# Patient Record
Sex: MALE | Race: WHITE | Employment: OTHER | URBAN - METROPOLITAN AREA
[De-identification: names, ages, dates, MRNs, and addresses within clinical notes are randomized per-mention and may not be internally consistent; named-entity substitution may affect disease eponyms.]

---

## 2018-01-16 PROBLEM — L85.3 DRY SKIN DERMATITIS: Status: ACTIVE | Noted: 2018-01-16

## 2018-07-16 PROBLEM — F32.A DEPRESSION: Status: ACTIVE | Noted: 2018-07-16

## 2019-12-19 ENCOUNTER — APPOINTMENT (OUTPATIENT)
Dept: CT IMAGING | Age: 84
End: 2019-12-19
Attending: EMERGENCY MEDICINE
Payer: MEDICARE

## 2019-12-19 ENCOUNTER — HOSPITAL ENCOUNTER (EMERGENCY)
Age: 84
Discharge: HOME | End: 2019-12-19
Attending: EMERGENCY MEDICINE
Payer: MEDICARE

## 2019-12-19 VITALS
OXYGEN SATURATION: 96 % | HEIGHT: 64 IN | TEMPERATURE: 98.5 F | DIASTOLIC BLOOD PRESSURE: 87 MMHG | WEIGHT: 157 LBS | SYSTOLIC BLOOD PRESSURE: 167 MMHG | BODY MASS INDEX: 26.8 KG/M2 | RESPIRATION RATE: 14 BRPM | HEART RATE: 93 BPM

## 2019-12-19 DIAGNOSIS — R51.9 ACUTE NONINTRACTABLE HEADACHE, UNSPECIFIED HEADACHE TYPE: Primary | ICD-10-CM

## 2019-12-19 LAB
ANION GAP SERPL CALC-SCNC: 7 MMOL/L (ref 7–16)
APTT PPP: 31.4 SEC (ref 24.7–39.8)
BUN SERPL-MCNC: 12 MG/DL (ref 8–23)
CALCIUM SERPL-MCNC: 8.7 MG/DL (ref 8.3–10.4)
CHLORIDE SERPL-SCNC: 102 MMOL/L (ref 98–107)
CO2 SERPL-SCNC: 28 MMOL/L (ref 21–32)
CREAT SERPL-MCNC: 0.88 MG/DL (ref 0.8–1.5)
ERYTHROCYTE [DISTWIDTH] IN BLOOD BY AUTOMATED COUNT: 12.4 % (ref 11.9–14.6)
GLUCOSE SERPL-MCNC: 110 MG/DL (ref 65–100)
HCT VFR BLD AUTO: 46.4 % (ref 41.1–50.3)
HGB BLD-MCNC: 16 G/DL (ref 13.6–17.2)
INR PPP: 1
MCH RBC QN AUTO: 33.1 PG (ref 26.1–32.9)
MCHC RBC AUTO-ENTMCNC: 34.5 G/DL (ref 31.4–35)
MCV RBC AUTO: 96.1 FL (ref 79.6–97.8)
NRBC # BLD: 0 K/UL (ref 0–0.2)
PLATELET # BLD AUTO: 182 K/UL (ref 150–450)
PMV BLD AUTO: 10.2 FL (ref 9.4–12.3)
POTASSIUM SERPL-SCNC: 3.7 MMOL/L (ref 3.5–5.1)
PROTHROMBIN TIME: 13.7 SEC (ref 11.7–14.5)
RBC # BLD AUTO: 4.83 M/UL (ref 4.23–5.6)
SODIUM SERPL-SCNC: 137 MMOL/L (ref 136–145)
WBC # BLD AUTO: 9.2 K/UL (ref 4.3–11.1)

## 2019-12-19 PROCEDURE — 85730 THROMBOPLASTIN TIME PARTIAL: CPT

## 2019-12-19 PROCEDURE — 80048 BASIC METABOLIC PNL TOTAL CA: CPT

## 2019-12-19 PROCEDURE — 96375 TX/PRO/DX INJ NEW DRUG ADDON: CPT | Performed by: EMERGENCY MEDICINE

## 2019-12-19 PROCEDURE — 74011250636 HC RX REV CODE- 250/636: Performed by: EMERGENCY MEDICINE

## 2019-12-19 PROCEDURE — 96374 THER/PROPH/DIAG INJ IV PUSH: CPT | Performed by: EMERGENCY MEDICINE

## 2019-12-19 PROCEDURE — 85610 PROTHROMBIN TIME: CPT

## 2019-12-19 PROCEDURE — 70450 CT HEAD/BRAIN W/O DYE: CPT

## 2019-12-19 PROCEDURE — 85027 COMPLETE CBC AUTOMATED: CPT

## 2019-12-19 PROCEDURE — 99284 EMERGENCY DEPT VISIT MOD MDM: CPT | Performed by: EMERGENCY MEDICINE

## 2019-12-19 RX ORDER — MORPHINE SULFATE 4 MG/ML
2 INJECTION INTRAVENOUS
Status: COMPLETED | OUTPATIENT
Start: 2019-12-19 | End: 2019-12-19

## 2019-12-19 RX ORDER — ONDANSETRON 2 MG/ML
4 INJECTION INTRAMUSCULAR; INTRAVENOUS
Status: COMPLETED | OUTPATIENT
Start: 2019-12-19 | End: 2019-12-19

## 2019-12-19 RX ORDER — SODIUM CHLORIDE 0.9 % (FLUSH) 0.9 %
5-40 SYRINGE (ML) INJECTION AS NEEDED
Status: DISCONTINUED | OUTPATIENT
Start: 2019-12-19 | End: 2019-12-19 | Stop reason: HOSPADM

## 2019-12-19 RX ORDER — SODIUM CHLORIDE 0.9 % (FLUSH) 0.9 %
5-40 SYRINGE (ML) INJECTION EVERY 8 HOURS
Status: DISCONTINUED | OUTPATIENT
Start: 2019-12-19 | End: 2019-12-19 | Stop reason: HOSPADM

## 2019-12-19 RX ORDER — BUTALBITAL, ACETAMINOPHEN AND CAFFEINE 300; 40; 50 MG/1; MG/1; MG/1
1 CAPSULE ORAL 3 TIMES DAILY
Qty: 11 CAP | Refills: 0 | Status: SHIPPED | OUTPATIENT
Start: 2019-12-19 | End: 2020-01-13 | Stop reason: SDUPTHER

## 2019-12-19 RX ADMIN — MORPHINE SULFATE 2 MG: 4 INJECTION INTRAVENOUS at 14:49

## 2019-12-19 RX ADMIN — ONDANSETRON 4 MG: 2 INJECTION INTRAMUSCULAR; INTRAVENOUS at 14:48

## 2019-12-19 NOTE — ED TRIAGE NOTES
Patient arrives via EMS from PCP office. Patient complains of headache since last Friday. Patient had CT ordered by PCP. Stated patient had previous bleed on old CT and bleed looked bigger on new CT scan. No s/sx of stroke. GCS 15. VS for EMS: bp 135/79, paced 70-80.

## 2019-12-19 NOTE — DISCHARGE INSTRUCTIONS
Return with any fevers, vomiting, confusion, worsening symptoms, or additional concerns. Follow-up with your primary care doctor for reevaluation as needed.

## 2019-12-19 NOTE — ED PROVIDER NOTES
26-year-old gentleman presents with concerns about a frontal headache that has been present for about a week and a half. He apparently was seen by his primary care doctor yesterday for this and had a outpatient head CT scan that showed a possible acute on chronic subdural hemorrhage. Patient then followed up with his primary care doctor today because of worsening symptoms and he was sent here for further evaluation. He said that he does not have any recollection of a significant fall or injury within the last several months. He denies any fevers or chills. He says he does not take any blood thinners. Elements of this note were created using speech recognition software. As such, errors of speech recognition may be present. 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    left    Atherosclerosis of native arteries of the extremities with intermittent claudication 7/14/2015    Atrioventricular block, complete ([address] 7/14/2015    S/p pacer    Backache, unspecified 7/14/2015    Bladder neck obstruction 7/14/2015    Bronchiectasis with acute exacerbation ([address] 7/14/2015    Cancer (HCC)     prostate    Cardiac pacemaker 3/23/2016    Carpal tunnel syndrome 7/14/2015    cervical radiculopathy 7/14/2015    Chest pain, unspecified 7/14/2015    Chronic lung disease     Chronic otitis externa 8/4/2016    COPD (chronic obstructive pulmonary disease) ([address] 3/23/2016    Coronary artery disease involving native coronary artery of native heart without angina pectoris 08/04/2011    LHC:mild to moderate CAD with normal LV EF.     Dyspepsia and other specified disorders of function of stomach 7/14/2015    Emphysema of lung ([facility] 8/4/2016    Encounter for long-term (current) use of other medications 7/14/2015    Erectile dysfunction 3/23/2016    Generalized anxiety disorder 7/14/2015    hyperlipidemia 7/14/2015    Hypertrophy of breast 7/14/2015    Insomnia, unspecified 7/14/2015    Malignant neoplasm of prostate ([facility] 7/14/2015    Followed by dr Kelly Judge    Migraine, unspecified, without mention of intractable migraine without mention of status migrainosus 7/14/2015    Other constipation 7/14/2015    Other dyspnea and respiratory abnormality 7/14/2015    Other malaise and fatigue 7/14/2015    Pacemaker     Panic attacks 8/4/2016    Paroxysmal atrial fibrillation ([facility] 3/23/2016    Peyronie's disease 7/14/2015    Pneumonia, organism unspecified(486) 7/14/2015    Psychiatric disorder     anxiety attacks    PUD (peptic ulcer disease)     pulmonary fibrosis 7/14/2015    RBBB (right bundle branch block)     SNHL (sensorineural hearing loss) 8/4/2016    ulnar palsy 7/14/2015    Unspecified asthma(493.90) 7/14/2015       Past Surgical History:   Procedure Laterality Date    HX CHOLECYSTECTOMY  5/2/2003    HX OTHER SURGICAL      Prostate Cancer    HX PACEMAKER      HX TURP      HX VASECTOMY           Family History:   Problem Relation Age of Onset    Heart Attack Mother     Kidney Disease Father     Diabetes Sister        Social History     Socioeconomic History    Marital status:      Spouse name: Not on file    Number of children: Not on file    Years of education: Not on file    Highest education level: Not on file   Occupational History    Not on file   Social Needs    Financial resource strain: Not on file    Food insecurity:     Worry: Not on file     Inability: Not on file    Transportation needs:     Medical: Not on file     Non-medical: Not on file   Tobacco Use    Smoking status: Former Smoker     Packs/day: 5.00     Years: 50.00     Pack years: 250.00     Types: Cigarettes Last attempt to quit: 8/15/1991     Years since quittin.3    Smokeless tobacco: Current User    Tobacco comment: quit in ; cans of smokeless tobacco per week   Substance and Sexual Activity    Alcohol use: No     Alcohol/week: 0.0 standard drinks    Drug use: Yes     Types: Prescription    Sexual activity: Not on file   Lifestyle    Physical activity:     Days per week: Not on file     Minutes per session: Not on file    Stress: Not on file   Relationships    Social connections:     Talks on phone: Not on file     Gets together: Not on file     Attends [employer] service: Not on file     Active member of club or organization: Not on file     Attends meetings of clubs or organizations: Not on file     Relationship status: Not on file    Intimate partner violence:     Fear of current or ex partner: Not on file     Emotionally abused: Not on file     Physically abused: Not on file     Forced sexual activity: Not on file   Other Topics Concern     Service Not Asked    Blood Transfusions Not Asked    Caffeine Concern Not Asked    Occupational Exposure Not Asked   [employer] Hazards Not Asked    Sleep Concern Not Asked    Stress Concern Not Asked    Weight Concern Not Asked    Special Diet Not Asked    Back Care Not Asked    Exercise Yes    Bike Helmet Not Asked    Seat Belt Not Asked    Self-Exams Not Asked   Social History Narrative    Not on file         ALLERGIES: Patient has no known allergies. Review of Systems   Constitutional: Negative for chills, diaphoresis and fever. HENT: Negative for congestion, rhinorrhea and sore throat. Eyes: Negative for redness and visual disturbance. Respiratory: Negative for cough, chest tightness, shortness of breath and wheezing. Cardiovascular: Negative for chest pain and palpitations. Gastrointestinal: Positive for nausea. Negative for abdominal pain, blood in stool, diarrhea and vomiting.    Endocrine: Negative for polydipsia and polyuria. Genitourinary: Negative for dysuria and hematuria. Musculoskeletal: Negative for arthralgias, myalgias and neck stiffness. Skin: Negative for rash. Allergic/Immunologic: Negative for environmental allergies and food allergies. Neurological: Positive for headaches. Negative for dizziness and weakness. Hematological: Negative for adenopathy. Does not bruise/bleed easily. Psychiatric/Behavioral: Negative for confusion and sleep disturbance. The patient is not nervous/anxious. Vitals:    12/19/19 1310 12/19/19 1311 12/19/19 1332 12/19/19 1334   BP: 156/81 156/81     Pulse: 100 (!) 103 99 97   Resp: 16  20 24   Temp: 98.5 °F (36.9 °C)      SpO2: 95% 94% 97% 97%   Weight: 71.2 kg (157 lb)      Height: 5' 4\" (1.626 m)               Physical Exam  Vitals signs and nursing note reviewed. Constitutional:       General: He is not in acute distress. Appearance: He is well-developed. He is not toxic-appearing. HENT:      Head: Normocephalic and atraumatic. Eyes:      General: No scleral icterus. Right eye: No discharge. Left eye: No discharge. Conjunctiva/sclera: Conjunctivae normal.      Pupils: Pupils are equal, round, and reactive to light. Neck:      Musculoskeletal: Normal range of motion. No neck rigidity. Cardiovascular:      Rate and Rhythm: Normal rate and regular rhythm. Heart sounds: Normal heart sounds. Pulmonary:      Effort: Pulmonary effort is normal. No respiratory distress. Breath sounds: Normal breath sounds. No wheezing or rales. Chest:      Chest wall: No tenderness. Abdominal:      General: Bowel sounds are normal. There is no distension. Palpations: Abdomen is soft. Tenderness: There is no guarding or rebound. Musculoskeletal: Normal range of motion. General: No tenderness. Lymphadenopathy:      Cervical: No cervical adenopathy. Skin:     General: Skin is warm and dry.    Neurological:      General: No focal deficit present. Mental Status: He is alert and oriented to person, place, and time. Psychiatric:         Mood and Affect: Mood normal.         Behavior: Behavior normal.          MDM  Number of Diagnoses or Management Options  Diagnosis management comments: I will repeat the head CT scan to see if there is been any expansion from yesterday's. I will check his basic blood work and try to treat his nausea. CT head findings are fairly minimal.  I do not think they are significant source of symptoms for the patient. He may just have a benign headache. I will discharge him home with some headache medicine and encouraged him to get a repeat CT scan in 72 hours.          Procedures

## 2019-12-19 NOTE — ED NOTES
I have reviewed discharge instructions with the patient. The patient verbalized understanding. Patient left ED via Discharge Method: wheelchair to Home with family. Opportunity for questions and clarification provided. Patient given 1 scripts. To continue your aftercare when you leave the hospital, you may receive an automated call from our care team to check in on how you are doing. This is a free service and part of our promise to provide the best care and service to meet your aftercare needs.  If you have questions, or wish to unsubscribe from this service please call [phone]. Thank you for Choosing our [facility].

## 2019-12-30 PROBLEM — G96.08 SUBDURAL HYGROMA: Status: ACTIVE | Noted: 2019-12-30

## 2019-12-30 PROBLEM — I65.09 VERTEBRAL ARTERY STENOSIS: Status: ACTIVE | Noted: 2019-12-30

## 2021-05-05 ENCOUNTER — HOSPITAL ENCOUNTER (OUTPATIENT)
Dept: ULTRASOUND IMAGING | Age: 86
Discharge: HOME | End: 2021-05-05
Attending: INTERNAL MEDICINE
Payer: MEDICARE

## 2021-05-05 DIAGNOSIS — I70.213 ATHEROSCLEROSIS OF NATIVE ARTERY OF BOTH LOWER EXTREMITIES WITH INTERMITTENT CLAUDICATION (HCC): ICD-10-CM

## 2021-05-05 PROCEDURE — 93922 UPR/L XTREMITY ART 2 LEVELS: CPT

## 2022-03-19 PROBLEM — F32.A DEPRESSION: Status: ACTIVE | Noted: 2018-07-16

## 2022-03-19 PROBLEM — I65.09 VERTEBRAL ARTERY STENOSIS: Status: ACTIVE | Noted: 2019-12-30

## 2022-03-20 PROBLEM — G96.08 SUBDURAL HYGROMA: Status: ACTIVE | Noted: 2019-12-30

## 2022-03-20 PROBLEM — L85.3 DRY SKIN DERMATITIS: Status: ACTIVE | Noted: 2018-01-16

## 2022-06-03 ENCOUNTER — OFFICE VISIT (OUTPATIENT)
Dept: CARDIOLOGY CLINIC | Age: 87
End: 2022-06-03
Payer: MEDICARE

## 2022-06-03 VITALS
SYSTOLIC BLOOD PRESSURE: 122 MMHG | WEIGHT: 156 LBS | HEIGHT: 64 IN | BODY MASS INDEX: 26.63 KG/M2 | DIASTOLIC BLOOD PRESSURE: 62 MMHG | HEART RATE: 69 BPM

## 2022-06-03 DIAGNOSIS — I48.0 PAROXYSMAL ATRIAL FIBRILLATION (HCC): Primary | ICD-10-CM

## 2022-06-03 DIAGNOSIS — I25.10 CORONARY ARTERY DISEASE INVOLVING NATIVE CORONARY ARTERY OF NATIVE HEART WITHOUT ANGINA PECTORIS: ICD-10-CM

## 2022-06-03 DIAGNOSIS — Z95.0 PACEMAKER: ICD-10-CM

## 2022-06-03 PROCEDURE — G8417 CALC BMI ABV UP PARAM F/U: HCPCS | Performed by: INTERNAL MEDICINE

## 2022-06-03 PROCEDURE — 4004F PT TOBACCO SCREEN RCVD TLK: CPT | Performed by: INTERNAL MEDICINE

## 2022-06-03 PROCEDURE — 1123F ACP DISCUSS/DSCN MKR DOCD: CPT | Performed by: INTERNAL MEDICINE

## 2022-06-03 PROCEDURE — 99214 OFFICE O/P EST MOD 30 MIN: CPT | Performed by: INTERNAL MEDICINE

## 2022-06-03 PROCEDURE — 93000 ELECTROCARDIOGRAM COMPLETE: CPT | Performed by: INTERNAL MEDICINE

## 2022-06-03 PROCEDURE — G8427 DOCREV CUR MEDS BY ELIG CLIN: HCPCS | Performed by: INTERNAL MEDICINE

## 2022-06-03 RX ORDER — DOCUSATE SODIUM 250 MG
250 CAPSULE ORAL DAILY
COMMUNITY

## 2022-06-03 RX ORDER — FOLIC ACID 0.8 MG
TABLET ORAL DAILY
COMMUNITY

## 2022-06-03 ASSESSMENT — ENCOUNTER SYMPTOMS
BOWEL INCONTINENCE: 0
CHEST TIGHTNESS: 0
ABDOMINAL PAIN: 0
SHORTNESS OF BREATH: 0
BLURRED VISION: 0
SPUTUM PRODUCTION: 0
HEMATEMESIS: 0
WHEEZING: 0
ORTHOPNEA: 0
COLOR CHANGE: 0
HOARSE VOICE: 0
HEMATOCHEZIA: 0
DIARRHEA: 0

## 2022-06-03 NOTE — PROGRESS NOTES
[facility] CARDIOLOGY  [address], [address] [facility], [address] [place] [address]  PHONE: [phone]        22        NAME:  Abel Cristina  :   MRN: [id number]       SUBJECTIVE:   Abel Cristina is a 80 y.o. male seen for a follow up visit regarding the following: The patient has a hx of mild CAD,SDH,SSS with pacemaker,and chronic chest pain. He returns for follow up. Echo in 2022 reported low normal LV EF(50-55%),aortic valve sclerosis,mild MR,and mild to moderate TR. I discussed echo results with the patient. Bentley Guevara reports having issues with balance,walking,and headaches. Chief Complaint   Patient presents with    Coronary Artery Disease     late yearly    Atrial Fibrillation       HPI:    Coronary Artery Disease  Presents for follow-up visit. Symptoms include dizziness and muscle weakness. Pertinent negatives include no chest pain, chest pressure, chest tightness, leg swelling, palpitations, shortness of breath or weight gain. Past Medical History, Past Surgical History, Family history, Social History, and Medications were all reviewed with the patient today and updated as necessary.          Current Outpatient Medications:     Garlic 0141 MG CAPS, Take by mouth daily, Disp: , Rfl:     Multiple Vitamins-Minerals (CENTRUM SILVER 50+MEN PO), Take by mouth daily, Disp: , Rfl:     vitamin D (CHOLECALCIFEROL) 25 MCG (1000 UT) TABS tablet, Take 1,000 Units by mouth daily, Disp: , Rfl:     docusate sodium (STOOL SOFTENER) 250 MG capsule, Take 250 mg by mouth daily, Disp: , Rfl:     Magnesium 500 MG CAPS, Take by mouth daily, Disp: , Rfl:     AZO CRANBERRY GUMMIES PO, Take by mouth daily, Disp: , Rfl:     Glucos-Chond-Hyal Ac-Ca Fructo (MOVE FREE JOINT HEALTH ADVANCE PO), Take by mouth daily, Disp: , Rfl:     acetaminophen (TYLENOL) 500 MG tablet, Take 1,000 mg by mouth every 6 hours as needed, Disp: , Rfl:     albuterol sulfate  (90 Base) MCG/ACT inhaler, Inhale 2 puffs into the lungs every 4 hours as needed, Disp: , Rfl:     aspirin 81 MG EC tablet, Take 81 mg by mouth daily, Disp: , Rfl:     fluorouracil (EFUDEX) 5 % cream, Apply topically daily, Disp: , Rfl:     fluticasone (FLONASE) 50 MCG/ACT nasal spray, 2 sprays by Nasal route daily as needed, Disp: , Rfl:     fluticasone (FLOVENT HFA) 44 MCG/ACT inhaler, Inhale 2 puffs into the lungs 2 times daily, Disp: , Rfl:     ibuprofen (ADVIL;MOTRIN) 200 MG tablet, Take 400 mg by mouth every 8 hours as needed, Disp: , Rfl:     levothyroxine (SYNTHROID) 50 MCG tablet, TAKE 1 TABLET BY MOUTH EVERY DAY BEFORE BREAKFAST, Disp: , Rfl:     LORazepam (ATIVAN) 1 MG tablet, TAKE 1 TAB BY MOUTH 2 TIMES DAILY AS NEEDED FOR ANXIETY. MAX 2 PER DAY, Disp: , Rfl:     meclizine (ANTIVERT) 25 MG tablet, Take 25 mg by mouth 3 times daily as needed, Disp: , Rfl:     nitroGLYCERIN (NITROSTAT) 0.4 MG SL tablet, Place 0.4 mg under the tongue, Disp: , Rfl:     potassium gluconate 550 mg tablet, Take 99 mg by mouth daily, Disp: , Rfl:     traMADol (ULTRAM) 50 MG tablet, Take 50 mg by mouth every 8 hours as needed. 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      Pulmonary:      Effort: Pulmonary effort is normal.      Breath sounds: Normal breath sounds. Abdominal:      General: Abdomen is flat. Bowel sounds are normal.      Palpations: Abdomen is soft. Musculoskeletal:         General: Normal range of motion. Cervical back: Normal range of motion and neck supple. Skin:     General: Skin is warm and dry. Neurological:      General: No focal deficit present. Mental Status: He is alert and oriented to person, place, and time. Psychiatric:         Mood and Affect: Mood normal.         Medical problems and test results were reviewed with the patient today.      Recent Results (from the past 672 hour(s))   Transthoracic echocardiogram (TTE) complete with contrast, bubble, strain, and 3D PRN    Collection Time: 05/11/22 10:13 AM   Result Value Ref Range    LV ESV A4C 28 mL    LV EDV A4C 52 mL    LV ESV A2C 29 mL    LV EDV A2C 46 mL    LVOT VTI 13.6 cm    LV E' Septal Velocity 6 cm/s    LVOT Diameter 2.0 cm    LVOT Mean Gradient 1 mmHg    LVOT Peak Velocity 0.7 m/s    LV E' Lateral Velocity 6 cm/s    LVPWd 1.0 0.6 - 1 cm    LVOT Peak Gradient 2 mmHg    IVSd 1.0 0.6 - 1 cm    LVIDs 1.9 cm    LVIDd 3.5 (A) 4.2 - 5.9 cm    EF BP 42 (A) 55 - 100 %    LVOT SV 42.7 ml    LV Ejection Fraction A2C 38 %    LV Ejection Fraction A4C 47 %    RVIDd 3.1 cm    LA Diameter 2.6 cm    Est. RA Pressure 3 mmHg    AV Peak Gradient 10 mmHg    AV Peak Velocity 1.6 m/s    AV Area by Peak Velocity 1.3 Square Centimeter    MV E Velocity 0.51 m/s    MR .0 cm    MV A Velocity 0.86 m/s    TR Max Velocity 2.60 m/s    TR Peak Gradient 20 mmHg    Aortic Root 2.7 cm    Fractional Shortening 2D 46 28 - 44 %    LV ESV Index A4C 16 mL/m2    LV EDV Index A4C 30 mL/m2    LV ESV Index A2C 16 mL/m2    LV EDV Index A2C 26 mL/m2    LVIDd Index 1.99 cm/m2    LVIDs Index 1.08 cm/m2    LV RWT Ratio 0.57     LV Mass 2D 103.4 88 - 224 g    LV Mass 2D Index 58.7 49 - 115 g/m2    MV E/A 0.59 E/E' Ratio (Averaged) 8.50     E/E' Lateral 8.50     E/E' Septal 8.50     LVOT Stroke Volume Index 24.3 mL/m2    LVOT Area 3.1 cm2    LA Size Index 1.48 cm/m2    LA/AO Root Ratio 0.96     Ao Root Index 1.53 cm/m2    AV Velocity Ratio 0.44     RVSP 30 mmHg    LA Volume BP 34 18 - 58 mL    LA Volume Index BP 19 16 - 34 ml/m2    TAPSE 1.3 (A) 1.7 cm     Lab Results   Component Value Date    CHOL 151 10/19/2021    HDL 34 10/19/2021    VLDL 22 10/19/2021     Results for orders placed or performed in visit on 06/03/22   EKG 12 lead    Impression    Electronic ventricular pacemaker   Pacemaker ECG, No further analysis   INSUFFICIENT DATA       ASSESSMENT and PLAN    Elder Breeding was seen today for coronary artery disease and atrial fibrillation. Diagnoses and all orders for this visit:    Paroxysmal atrial fibrillation (HCC):Resolved. -     Cancel: EKG 12 lead; Future  -     EKG 12 lead    Pacemaker:Continue device clinic as scheduled. Coronary artery disease involving native coronary artery of native heart without angina pectoris:Continue ASA. Disposition:    Return in about 6 months (around 12/3/2022).                 Haley Saravia MD  6/3/2022  10:49 AM

## 2022-07-22 ENCOUNTER — TELEPHONE (OUTPATIENT)
Dept: CARDIOLOGY CLINIC | Age: 87
End: 2022-07-22

## 2022-07-22 NOTE — TELEPHONE ENCOUNTER
----- Message from Juan Rosen MD sent at 7/22/2022  7:54 AM EDT -----  Regarding: Brief Afib noted on pacer check. I need to discuss oral anticoagultion therapy with patient unless he declines. He needs a routine follow up with me discuss unless done son in the past.    ----- Message -----  From: Yogesh Bauman  Sent: 7/21/2022   3:54 PM EDT  To:  Juan Rosen MD

## 2022-07-28 ENCOUNTER — OFFICE VISIT (OUTPATIENT)
Dept: CARDIOLOGY CLINIC | Age: 87
End: 2022-07-28

## 2022-07-28 VITALS
DIASTOLIC BLOOD PRESSURE: 70 MMHG | BODY MASS INDEX: 26.46 KG/M2 | SYSTOLIC BLOOD PRESSURE: 120 MMHG | HEIGHT: 64 IN | WEIGHT: 155 LBS | HEART RATE: 88 BPM

## 2022-07-28 DIAGNOSIS — I48.0 PAROXYSMAL ATRIAL FIBRILLATION (HCC): Primary | ICD-10-CM

## 2022-07-28 DIAGNOSIS — Z95.0 PACEMAKER: ICD-10-CM

## 2022-07-28 PROBLEM — N18.30 CHRONIC RENAL DISEASE, STAGE III (HCC): Status: ACTIVE | Noted: 2022-07-28

## 2022-07-28 RX ORDER — LANOLIN ALCOHOL/MO/W.PET/CERES
3 CREAM (GRAM) TOPICAL DAILY
COMMUNITY

## 2022-07-28 ASSESSMENT — ENCOUNTER SYMPTOMS
CHEST TIGHTNESS: 0
COLOR CHANGE: 0
BLURRED VISION: 0
SPUTUM PRODUCTION: 0
HEMATEMESIS: 0
HOARSE VOICE: 0
DIARRHEA: 0
SHORTNESS OF BREATH: 0
ORTHOPNEA: 0
ABDOMINAL PAIN: 0
HEMATOCHEZIA: 0
WHEEZING: 0
BOWEL INCONTINENCE: 0

## 2022-07-28 NOTE — PROGRESS NOTES
[facility] CARDIOLOGY  [address], [facility], [address] [place] [address]  PHONE: [phone]        22        NAME:  Kali Wong  :   MRN: [id number]       SUBJECTIVE:   Kali Wong is a 80 y.o. male seen for a follow up visit regarding the following: The patient has a hx of mild CAD,SSS with a pacemaker,and PAF. Recent pacemaker interrogation reported a 6 minute episode of AF(asymptomatic). He returns for discussion of PAF and candidacy for chronic [address] for stroke prevention. I discussed dx and treatment options with the patient and his grandson. He has a hx of SDH X 2. He is opposed to [address] and I agree. Presently,he is not interested in Elaine device. Chief Complaint   Patient presents with    Coronary Artery Disease       HPI:    Coronary Artery Disease  Presents for follow-up visit. Pertinent negatives include no chest pain, chest pressure, chest tightness, dizziness, leg swelling, muscle weakness, palpitations, shortness of breath or weight gain. Past Medical History, Past Surgical History, Family history, Social History, and Medications were all reviewed with the patient today and updated as necessary.          Current Outpatient Medications:     melatonin 3 MG TABS tablet, Take 3 mg by mouth in the morning., Disp: , Rfl:     Garlic 5649 MG CAPS, Take by mouth daily, Disp: , Rfl:     Multiple Vitamins-Minerals (CENTRUM SILVER 50+MEN PO), Take by mouth daily, Disp: , Rfl:     vitamin D (CHOLECALCIFEROL) 25 MCG (1000 UT) TABS tablet, Take 1,000 Units by mouth daily, Disp: , Rfl:     docusate sodium (COLACE) 250 MG capsule, Take 250 mg by mouth daily, Disp: , Rfl:     Magnesium 500 MG CAPS, Take by mouth daily, Disp: , Rfl:     AZO CRANBERRY GUMMIES PO, Take by mouth daily, Disp: , Rfl:     Glucos-Chond-Hyal Ac-Ca Fructo (MOVE FREE JOINT HEALTH ADVANCE PO), Take by mouth daily, Disp: , Rfl:     acetaminophen (TYLENOL) 500 MG tablet, Take 1,000 mg by mouth every 6 hours as needed, Disp: , Rfl:     albuterol sulfate  (90 Base) MCG/ACT inhaler, Inhale 2 puffs into the lungs every 4 hours as needed, Disp: , Rfl:     fluorouracil (EFUDEX) 5 % cream, Apply topically daily, Disp: , Rfl:     fluticasone (FLONASE) 50 MCG/ACT nasal spray, 2 sprays by Nasal route daily as needed, Disp: , Rfl:     fluticasone (FLOVENT HFA) 44 MCG/ACT inhaler, Inhale 2 puffs into the lungs 2 times daily, Disp: , Rfl:     ibuprofen (ADVIL;MOTRIN) 200 MG tablet, Take 400 mg by mouth every 8 hours as needed, Disp: , Rfl:     levothyroxine (SYNTHROID) 50 MCG tablet, TAKE 1 TABLET BY MOUTH EVERY DAY BEFORE BREAKFAST, Disp: , Rfl:     LORazepam (ATIVAN) 1 MG tablet, TAKE 1 TAB BY MOUTH 2 TIMES DAILY AS NEEDED FOR ANXIETY. MAX 2 PER DAY, Disp: , Rfl:     meclizine (ANTIVERT) 25 MG tablet, Take 25 mg by mouth 3 times daily as needed, Disp: , Rfl:     nitroGLYCERIN (NITROSTAT) 0.4 MG SL tablet, Place 0.4 mg under the tongue, Disp: , Rfl:     potassium gluconate 550 mg tablet, Take 99 mg by mouth daily, Disp: , Rfl:     traMADol (ULTRAM) 50 MG tablet, Take 50 mg by mouth every 8 hours as needed. 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)     prostate    Cardiac pacemaker 3/23/2016    Carpal tunnel syndrome 7/14/2015    Chest pain, unspecified 7/14/2015    Chronic lung disease     Chronic otitis externa 8/4/2016    Chronic renal disease, stage III [facility]) [[id number]] 7/28/2022    COPD (chronic obstructive pulmonary disease) ([facility] 3/23/2016    Coronary artery disease involving native coronary artery of native heart without angina pectoris 08/04/2011    LHC:mild to moderate CAD with normal LV EF.     Dyspepsia and other specified disorders of function of stomach 7/14/2015    Emphysema of lung ([facility] [address] 8/4/2016    Encounter for long-term (current) use of other medications 7/14/2015    Erectile dysfunction 3/23/2016    Fibrosis, idiopathic pulmonary ([facility] 7/14/2015    Generalized anxiety disorder 7/14/2015    Hypertrophy of breast 7/14/2015    Insomnia, unspecified 7/14/2015    Lesion of ulnar nerve 7/14/2015    Malignant neoplasm of prostate ([facility] [address] 7/14/2015    Followed by dr Fernie Gillespie    Migraine, unspecified, without mention of intractable migraine without mention of status migrainosus 7/14/2015    Other and unspecified hyperlipidemia 7/14/2015    Other constipation 7/14/2015    Other dyspnea and respiratory abnormality 7/14/2015    Other malaise and fatigue 7/14/2015    Pacemaker     Panic attacks 8/4/2016    Paroxysmal atrial fibrillation ([facility] 3/23/2016    Peyronie's disease 7/14/2015    Pneumonia, organism unspecified(486) 7/14/2015    Psychiatric disorder     anxiety attacks    PUD (peptic ulcer disease)     RBBB (right bundle branch block)     SNHL (sensorineural hearing loss) 8/4/2016    Unspecified asthma(493.90) 7/14/2015     Past Surgical History:   Procedure Laterality Date    CHOLECYSTECTOMY  5/2/2003    OTHER SURGICAL HISTORY      Prostate Cancer    PACEMAKER      TURP      VASECTOMY       Family History   Problem Relation Age of Onset    Heart Attack Mother     Kidney Disease Father     Diabetes Sister       Social History     Tobacco Use    Smoking status: Former Packs/day: 5.00     Types: Cigarettes     Quit date: 8/15/1991     Years since quittin.9    Smokeless tobacco: Current    Tobacco comments:     Quit smoking: quit in ; cans of smokeless tobacco per week   Substance Use Topics    Alcohol use: No     Alcohol/week: 0.0 standard drinks       ROS:    Review of Systems   Constitutional: Negative for chills, decreased appetite, diaphoresis, fever, malaise/fatigue and weight gain. HENT:  Negative for congestion, hearing loss, hoarse voice and nosebleeds. Eyes:  Negative for blurred vision. Cardiovascular:  Negative for chest pain, claudication, cyanosis, dyspnea on exertion, irregular heartbeat, leg swelling, near-syncope, orthopnea, palpitations, paroxysmal nocturnal dyspnea and syncope. Respiratory:  Negative for chest tightness, shortness of breath, sputum production and wheezing. Endocrine: Negative for polydipsia, polyphagia and polyuria. Skin:  Negative for color change. Musculoskeletal:  Negative for muscle weakness. Gastrointestinal:  Negative for abdominal pain, bowel incontinence, diarrhea, hematemesis and hematochezia. Genitourinary:  Negative for dysuria, frequency and hematuria. Neurological:  Negative for dizziness, focal weakness, light-headedness, loss of balance, numbness, sensory change and weakness. Psychiatric/Behavioral:  Negative for altered mental status and memory loss. The patient has insomnia and is nervous/anxious. PHYSICAL EXAM:   /70   Pulse 88   Ht 5' 4\" (1.626 m)   Wt 155 lb (70.3 kg)   BMI 26.61 kg/m²      Physical Exam  Constitutional:       Appearance: Normal appearance. HENT:      Head: Normocephalic and atraumatic. Nose: Nose normal.   Eyes:      Extraocular Movements: Extraocular movements intact. Pupils: Pupils are equal, round, and reactive to light. Neck:      Vascular: No carotid bruit. Cardiovascular:      Rate and Rhythm: Regular rhythm. Pulses: Normal pulses. Heart sounds: No murmur heard. Pulmonary:      Effort: Pulmonary effort is normal.      Breath sounds: Normal breath sounds. Abdominal:      General: Abdomen is flat. Bowel sounds are normal.      Palpations: Abdomen is soft. Musculoskeletal:         General: Normal range of motion. Cervical back: Normal range of motion and neck supple. Skin:     General: Skin is warm and dry. Neurological:      General: No focal deficit present. Mental Status: He is alert and oriented to person, place, and time. Psychiatric:         Mood and Affect: Mood normal.       Medical problems and test results were reviewed with the patient today. No results found for this or any previous visit (from the past 672 hour(s)). Lab Results   Component Value Date/Time    CHOL 151 10/19/2021 10:25 AM    HDL 34 10/19/2021 10:25 AM    VLDL 22 10/19/2021 10:25 AM     No results found for any visits on 07/28/22. ASSESSMENT and PLAN    Ethan Mayer was seen today for coronary artery disease. Diagnoses and all orders for this visit:    Paroxysmal atrial fibrillation ([address] Continue current medications. He is not an [address] [place] [address] candidate. Pacemaker:Continue device clinic as scheduled. Disposition:    Return in about 6 months (around 1/28/2023).                 Duran Thacker MD  7/28/2022  4:39 PM

## 2022-08-03 ENCOUNTER — OFFICE VISIT (OUTPATIENT)
Dept: INTERNAL MEDICINE CLINIC | Facility: CLINIC | Age: 87
End: 2022-08-03
Payer: MEDICARE

## 2022-08-03 VITALS
SYSTOLIC BLOOD PRESSURE: 115 MMHG | TEMPERATURE: 98.1 F | HEART RATE: 70 BPM | HEIGHT: 64 IN | DIASTOLIC BLOOD PRESSURE: 61 MMHG | WEIGHT: 150.6 LBS | OXYGEN SATURATION: 95 % | RESPIRATION RATE: 18 BRPM | BODY MASS INDEX: 25.71 KG/M2

## 2022-08-03 DIAGNOSIS — I70.213 ATHEROSCLEROSIS OF NATIVE ARTERIES OF EXTREMITIES WITH INTERMITTENT CLAUDICATION, BILATERAL LEGS (HCC): ICD-10-CM

## 2022-08-03 DIAGNOSIS — F41.9 ANXIETY: ICD-10-CM

## 2022-08-03 DIAGNOSIS — J44.9 CHRONIC OBSTRUCTIVE PULMONARY DISEASE, UNSPECIFIED COPD TYPE (HCC): ICD-10-CM

## 2022-08-03 DIAGNOSIS — I48.0 PAROXYSMAL ATRIAL FIBRILLATION (HCC): Primary | ICD-10-CM

## 2022-08-03 LAB
ALBUMIN SERPL-MCNC: 3.5 G/DL (ref 3.2–4.6)
ALBUMIN/GLOB SERPL: 1.1 {RATIO} (ref 1.2–3.5)
ALP SERPL-CCNC: 119 U/L (ref 50–136)
ALT SERPL-CCNC: 17 U/L (ref 12–65)
ANION GAP SERPL CALC-SCNC: 4 MMOL/L (ref 7–16)
AST SERPL-CCNC: 15 U/L (ref 15–37)
BILIRUB SERPL-MCNC: 0.9 MG/DL (ref 0.2–1.1)
BUN SERPL-MCNC: 9 MG/DL (ref 8–23)
CALCIUM SERPL-MCNC: 8.8 MG/DL (ref 8.3–10.4)
CHLORIDE SERPL-SCNC: 106 MMOL/L (ref 98–107)
CO2 SERPL-SCNC: 30 MMOL/L (ref 21–32)
CREAT SERPL-MCNC: 1 MG/DL (ref 0.8–1.5)
GLOBULIN SER CALC-MCNC: 3.1 G/DL (ref 2.3–3.5)
GLUCOSE SERPL-MCNC: 99 MG/DL (ref 65–100)
POTASSIUM SERPL-SCNC: 4.4 MMOL/L (ref 3.5–5.1)
PROT SERPL-MCNC: 6.6 G/DL (ref 6.3–8.2)
SODIUM SERPL-SCNC: 140 MMOL/L (ref 138–145)
T4 FREE SERPL-MCNC: 1.4 NG/DL (ref 0.78–1.46)
TSH W FREE THYROID IF ABNORMAL: 0.32 UIU/ML (ref 0.36–3.74)

## 2022-08-03 PROCEDURE — 4004F PT TOBACCO SCREEN RCVD TLK: CPT | Performed by: INTERNAL MEDICINE

## 2022-08-03 PROCEDURE — 1123F ACP DISCUSS/DSCN MKR DOCD: CPT | Performed by: INTERNAL MEDICINE

## 2022-08-03 PROCEDURE — 99214 OFFICE O/P EST MOD 30 MIN: CPT | Performed by: INTERNAL MEDICINE

## 2022-08-03 PROCEDURE — G8417 CALC BMI ABV UP PARAM F/U: HCPCS | Performed by: INTERNAL MEDICINE

## 2022-08-03 PROCEDURE — 3023F SPIROM DOC REV: CPT | Performed by: INTERNAL MEDICINE

## 2022-08-03 PROCEDURE — G8427 DOCREV CUR MEDS BY ELIG CLIN: HCPCS | Performed by: INTERNAL MEDICINE

## 2022-08-03 RX ORDER — LORAZEPAM 1 MG/1
1 TABLET ORAL 2 TIMES DAILY PRN
Qty: 60 TABLET | Refills: 5 | Status: SHIPPED | OUTPATIENT
Start: 2022-08-03 | End: 2023-01-30

## 2022-08-03 SDOH — ECONOMIC STABILITY: FOOD INSECURITY: WITHIN THE PAST 12 MONTHS, THE FOOD YOU BOUGHT JUST DIDN'T LAST AND YOU DIDN'T HAVE MONEY TO GET MORE.: NEVER TRUE

## 2022-08-03 SDOH — ECONOMIC STABILITY: FOOD INSECURITY: WITHIN THE PAST 12 MONTHS, YOU WORRIED THAT YOUR FOOD WOULD RUN OUT BEFORE YOU GOT MONEY TO BUY MORE.: NEVER TRUE

## 2022-08-03 ASSESSMENT — PATIENT HEALTH QUESTIONNAIRE - PHQ9
SUM OF ALL RESPONSES TO PHQ QUESTIONS 1-9: 15
SUM OF ALL RESPONSES TO PHQ9 QUESTIONS 1 & 2: 6
9. THOUGHTS THAT YOU WOULD BE BETTER OFF DEAD, OR OF HURTING YOURSELF: 0
1. LITTLE INTEREST OR PLEASURE IN DOING THINGS: 3
3. TROUBLE FALLING OR STAYING ASLEEP: 3
SUM OF ALL RESPONSES TO PHQ QUESTIONS 1-9: 15
SUM OF ALL RESPONSES TO PHQ QUESTIONS 1-9: 15
10. IF YOU CHECKED OFF ANY PROBLEMS, HOW DIFFICULT HAVE THESE PROBLEMS MADE IT FOR YOU TO DO YOUR WORK, TAKE CARE OF THINGS AT HOME, OR GET ALONG WITH OTHER PEOPLE: 0
8. MOVING OR SPEAKING SO SLOWLY THAT OTHER PEOPLE COULD HAVE NOTICED. OR THE OPPOSITE, BEING SO FIGETY OR RESTLESS THAT YOU HAVE BEEN MOVING AROUND A LOT MORE THAN USUAL: 0
2. FEELING DOWN, DEPRESSED OR HOPELESS: 3
6. FEELING BAD ABOUT YOURSELF - OR THAT YOU ARE A FAILURE OR HAVE LET YOURSELF OR YOUR FAMILY DOWN: 0
SUM OF ALL RESPONSES TO PHQ QUESTIONS 1-9: 15
5. POOR APPETITE OR OVEREATING: 3
7. TROUBLE CONCENTRATING ON THINGS, SUCH AS READING THE NEWSPAPER OR WATCHING TELEVISION: 0
4. FEELING TIRED OR HAVING LITTLE ENERGY: 3

## 2022-08-03 ASSESSMENT — SOCIAL DETERMINANTS OF HEALTH (SDOH): HOW HARD IS IT FOR YOU TO PAY FOR THE VERY BASICS LIKE FOOD, HOUSING, MEDICAL CARE, AND HEATING?: NOT HARD AT ALL

## 2022-08-03 NOTE — PROGRESS NOTES
8/3/2022 6:37 AM  Location:[facility] [address] [facility]  [place]  Patient #:  [id number]  YOB: 1934            History of Present Illness     Chief Complaint   Patient presents with    Follow-up     4 month       Mr. Alexandra Bethea is a 80 y.o. male  who presents for follow up on chronic medical problems. [employer] is with him today. His grandson notes that his anxiety is worse. Patient notes that his legs get weak on him. Is only able to walk about an eighth of a mile when he used to walk a mile every day. Other  Associated symptoms include arthralgias and weakness. Pertinent negatives include no chest pain, coughing, nausea or vomiting. 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    left    Atherosclerosis of native arteries of the extremities with intermittent claudication 7/14/2015    Atrioventricular block, complete ([address] 7/14/2015    S/p pacer    Backache, unspecified 7/14/2015    Bladder neck obstruction 7/14/2015    Brachial neuritis or radiculitis NOS 7/14/2015    Bronchiectasis with acute exacerbation ([address] 7/14/2015    Cancer ([address]     prostate    Cardiac pacemaker 3/23/2016    Carpal tunnel syndrome 7/14/2015    Chest pain, unspecified 7/14/2015    Chronic lung disease     Chronic otitis externa 8/4/2016    Chronic renal disease, stage III [facility]) [[id number]] 7/28/2022    COPD (chronic obstructive pulmonary disease) ([address] 3/23/2016    Coronary artery disease involving native coronary artery of native heart without angina pectoris 08/04/2011    LHC:mild to moderate CAD with normal LV EF.     Dyspepsia and other specified disorders of function of stomach 2015    Emphysema of lung ([facility] 2016    Encounter for long-term (current) use of other medications 2015    Erectile dysfunction 3/23/2016    Fibrosis, idiopathic pulmonary ([facility] 2015    Generalized anxiety disorder 2015    Hypertrophy of breast 2015    Insomnia, unspecified 2015    Lesion of ulnar nerve 2015    Malignant neoplasm of prostate ([facility] 2015    Followed by dr Candida Cooper    Migraine, unspecified, without mention of intractable migraine without mention of status migrainosus 2015    Other and unspecified hyperlipidemia 2015    Other constipation 2015    Other dyspnea and respiratory abnormality 2015    Other malaise and fatigue 2015    Pacemaker     Panic attacks 2016    Paroxysmal atrial fibrillation ([facility] 3/23/2016    Peyronie's disease 2015    Pneumonia, organism unspecified(486) 2015    Psychiatric disorder     anxiety attacks    PUD (peptic ulcer disease)     RBBB (right bundle branch block)     SNHL (sensorineural hearing loss) 2016    Unspecified asthma(493.90) 2015     Social History     Socioeconomic History    Marital status:      Spouse name: None    Number of children: None    Years of education: None    Highest education level: None   Tobacco Use    Smoking status: Former     Packs/day: 5.00     Types: Cigarettes     Quit date: 8/15/1991     Years since quittin.9    Smokeless tobacco: Current    Tobacco comments:     Quit smoking: quit in ; cans of smokeless tobacco per week   Substance and Sexual Activity    Alcohol use: No     Alcohol/week: 0.0 standard drinks    Drug use: Yes     Types: Prescription     Social Determinants of Health     Financial Resource Strain: Low Risk     Difficulty of Paying Living Expenses: Not hard at all   Food Insecurity: No Food Insecurity    Worried About Running Out of Food in the Last Year: Never true    Ran Out of Food in the Last Year: Never true     Past Surgical History:   Procedure Laterality Date    CHOLECYSTECTOMY  5/2/2003    OTHER SURGICAL HISTORY      Prostate Cancer    PACEMAKER      TURP      VASECTOMY       Current Outpatient Medications   Medication Sig Dispense Refill    LORazepam (ATIVAN) 1 MG tablet Take 1 tablet by mouth 2 times daily as needed for Anxiety for up to 180 days. TAKE 1 TAB BY MOUTH 2 TIMES DAILY AS NEEDED FOR ANXIETY. MAX 2 PER DAY 60 tablet 5    melatonin 3 MG TABS tablet Take 3 mg by mouth in the morning. Garlic 2888 MG CAPS Take by mouth daily      Multiple Vitamins-Minerals (CENTRUM SILVER 50+MEN PO) Take by mouth daily      docusate sodium (COLACE) 250 MG capsule Take 250 mg by mouth daily      Magnesium 500 MG CAPS Take by mouth daily      acetaminophen (TYLENOL) 500 MG tablet Take 1,000 mg by mouth every 6 hours as needed      albuterol sulfate  (90 Base) MCG/ACT inhaler Inhale 2 puffs into the lungs every 4 hours as needed      fluticasone (FLONASE) 50 MCG/ACT nasal spray 2 sprays by Nasal route daily as needed      fluticasone (FLOVENT HFA) 44 MCG/ACT inhaler Inhale 2 puffs into the lungs 2 times daily      ibuprofen (ADVIL;MOTRIN) 200 MG tablet Take 400 mg by mouth every 8 hours as needed      levothyroxine (SYNTHROID) 50 MCG tablet TAKE 1 TABLET BY MOUTH EVERY DAY BEFORE BREAKFAST      meclizine (ANTIVERT) 25 MG tablet Take 25 mg by mouth 3 times daily as needed      nitroGLYCERIN (NITROSTAT) 0.4 MG SL tablet Place 0.4 mg under the tongue      potassium gluconate 550 mg tablet Take 99 mg by mouth daily       No current facility-administered medications for this visit.      Health Maintenance   Topic Date Due    COVID-19 Vaccine (1) Never done    Shingles vaccine (2 of 2) 11/21/2019    Prostate Specific Antigen (PSA) Screening or Monitoring  04/15/2022    DTaP/Tdap/Td vaccine (1 - Tdap) 07/06/2022    Flu vaccine (1) 09/01/2022    Annual Wellness Visit (AWV)  10/20/2022    Depression Monitoring  08/03/2023 Pneumococcal 65+ years Vaccine  Completed    Hepatitis A vaccine  Aged Out    Hepatitis B vaccine  Aged Out    Hib vaccine  Aged Out    Meningococcal (ACWY) vaccine  Aged Out     Family History   Problem Relation Age of Onset    Heart Attack Mother     Kidney Disease Father     Diabetes Sister        Review of Systems  Review of Systems   Respiratory:  Negative for cough, shortness of breath and wheezing. Cardiovascular:  Negative for chest pain and palpitations. Gastrointestinal:  Negative for constipation, diarrhea, nausea and vomiting. Musculoskeletal:  Positive for arthralgias and back pain. Neurological:  Positive for dizziness and weakness. Psychiatric/Behavioral:  The patient is nervous/anxious. /61 (Site: Right Upper Arm, Position: Sitting, Cuff Size: Medium Adult)   Pulse 70   Temp 98.1 °F (36.7 °C) (Temporal)   Resp 18   Ht 5' 4\" (1.626 m)   Wt 150 lb 9.6 oz (68.3 kg)   SpO2 95% Comment: ra  BMI 25.85 kg/m²       Physical Exam    Physical Exam      Assessment & Plan    Current Outpatient Medications   Medication Sig Dispense Refill    LORazepam (ATIVAN) 1 MG tablet Take 1 tablet by mouth 2 times daily as needed for Anxiety for up to 180 days. TAKE 1 TAB BY MOUTH 2 TIMES DAILY AS NEEDED FOR ANXIETY. MAX 2 PER DAY 60 tablet 5    melatonin 3 MG TABS tablet Take 3 mg by mouth in the morning.       Garlic 2707 MG CAPS Take by mouth daily      Multiple Vitamins-Minerals (CENTRUM SILVER 50+MEN PO) Take by mouth daily      docusate sodium (COLACE) 250 MG capsule Take 250 mg by mouth daily      Magnesium 500 MG CAPS Take by mouth daily      acetaminophen (TYLENOL) 500 MG tablet Take 1,000 mg by mouth every 6 hours as needed      albuterol sulfate  (90 Base) MCG/ACT inhaler Inhale 2 puffs into the lungs every 4 hours as needed      fluticasone (FLONASE) 50 MCG/ACT nasal spray 2 sprays by Nasal route daily as needed      fluticasone (FLOVENT HFA) 44 MCG/ACT inhaler Inhale 2 puffs with Reflex    TSH with Reflex         Vitals look good. Dizziness may be related to postural changes. Advised him to drink more water as well as at least one drink daily with electrolytes in it. Try scheduling ativan as above. Knows to keep a low threshold for contacting the office with worsening symptoms. Call is worse or no better in the next 2 weeks. Will discuss labs over the phone. Follow up as documented or earlier as needed. Return in about 4 months (around 12/3/2022).           Kelly Rangel MD

## 2022-08-04 ASSESSMENT — ENCOUNTER SYMPTOMS
BACK PAIN: 1
DIARRHEA: 0
COUGH: 0
VOMITING: 0
SHORTNESS OF BREATH: 0
CONSTIPATION: 0
WHEEZING: 0
NAUSEA: 0

## 2022-10-25 PROCEDURE — 93296 REM INTERROG EVL PM/IDS: CPT | Performed by: INTERNAL MEDICINE

## 2022-10-25 PROCEDURE — 93294 REM INTERROG EVL PM/LDLS PM: CPT | Performed by: INTERNAL MEDICINE

## 2022-11-17 DIAGNOSIS — I44.2 ATRIOVENTRICULAR BLOCK, COMPLETE (HCC): Primary | ICD-10-CM

## 2022-11-17 DIAGNOSIS — I44.2 ATRIOVENTRICULAR BLOCK, COMPLETE (HCC): ICD-10-CM

## 2022-11-17 DIAGNOSIS — Z95.0 PACEMAKER: ICD-10-CM

## 2022-12-05 ENCOUNTER — OFFICE VISIT (OUTPATIENT)
Dept: CARDIOLOGY CLINIC | Age: 87
End: 2022-12-05
Payer: MEDICARE

## 2022-12-05 VITALS
WEIGHT: 152 LBS | DIASTOLIC BLOOD PRESSURE: 64 MMHG | BODY MASS INDEX: 25.95 KG/M2 | SYSTOLIC BLOOD PRESSURE: 124 MMHG | HEIGHT: 64 IN | HEART RATE: 78 BPM

## 2022-12-05 DIAGNOSIS — I47.1 PAT (PAROXYSMAL ATRIAL TACHYCARDIA) (HCC): ICD-10-CM

## 2022-12-05 DIAGNOSIS — I25.10 CORONARY ARTERY DISEASE INVOLVING NATIVE CORONARY ARTERY OF NATIVE HEART WITHOUT ANGINA PECTORIS: Primary | ICD-10-CM

## 2022-12-05 DIAGNOSIS — Z95.0 PACEMAKER: ICD-10-CM

## 2022-12-05 DIAGNOSIS — I48.0 PAROXYSMAL ATRIAL FIBRILLATION (HCC): ICD-10-CM

## 2022-12-05 PROBLEM — I47.19 PAT (PAROXYSMAL ATRIAL TACHYCARDIA): Status: ACTIVE | Noted: 2022-12-05

## 2022-12-05 PROCEDURE — 1123F ACP DISCUSS/DSCN MKR DOCD: CPT | Performed by: INTERNAL MEDICINE

## 2022-12-05 PROCEDURE — 4004F PT TOBACCO SCREEN RCVD TLK: CPT | Performed by: INTERNAL MEDICINE

## 2022-12-05 PROCEDURE — G8484 FLU IMMUNIZE NO ADMIN: HCPCS | Performed by: INTERNAL MEDICINE

## 2022-12-05 PROCEDURE — G8417 CALC BMI ABV UP PARAM F/U: HCPCS | Performed by: INTERNAL MEDICINE

## 2022-12-05 PROCEDURE — G8427 DOCREV CUR MEDS BY ELIG CLIN: HCPCS | Performed by: INTERNAL MEDICINE

## 2022-12-05 PROCEDURE — 99213 OFFICE O/P EST LOW 20 MIN: CPT | Performed by: INTERNAL MEDICINE

## 2022-12-05 RX ORDER — GABAPENTIN 600 MG/1
600 TABLET ORAL DAILY
COMMUNITY

## 2022-12-05 RX ORDER — METOPROLOL SUCCINATE 25 MG/1
25 TABLET, EXTENDED RELEASE ORAL DAILY
Qty: 30 TABLET | Refills: 5 | Status: SHIPPED | OUTPATIENT
Start: 2022-12-05

## 2022-12-05 RX ORDER — NITROGLYCERIN 0.4 MG/1
0.4 TABLET SUBLINGUAL EVERY 5 MIN PRN
Qty: 25 TABLET | Refills: 3 | Status: SHIPPED | OUTPATIENT
Start: 2022-12-05 | End: 2022-12-06

## 2022-12-05 RX ORDER — TRAZODONE HYDROCHLORIDE 50 MG/1
50 TABLET ORAL NIGHTLY
COMMUNITY

## 2022-12-05 ASSESSMENT — ENCOUNTER SYMPTOMS
BOWEL INCONTINENCE: 0
SHORTNESS OF BREATH: 0
BLURRED VISION: 0
COLOR CHANGE: 0
WHEEZING: 0
HEMATOCHEZIA: 0
ABDOMINAL PAIN: 0
DIARRHEA: 0
ORTHOPNEA: 0
HEMATEMESIS: 0
SPUTUM PRODUCTION: 0
HOARSE VOICE: 0

## 2022-12-06 ENCOUNTER — OFFICE VISIT (OUTPATIENT)
Dept: INTERNAL MEDICINE CLINIC | Facility: CLINIC | Age: 87
End: 2022-12-06
Payer: MEDICARE

## 2022-12-06 VITALS
RESPIRATION RATE: 16 BRPM | SYSTOLIC BLOOD PRESSURE: 119 MMHG | DIASTOLIC BLOOD PRESSURE: 62 MMHG | HEIGHT: 64 IN | WEIGHT: 152 LBS | BODY MASS INDEX: 25.95 KG/M2 | HEART RATE: 68 BPM

## 2022-12-06 DIAGNOSIS — I70.213 ATHEROSCLEROSIS OF NATIVE ARTERIES OF EXTREMITIES WITH INTERMITTENT CLAUDICATION, BILATERAL LEGS (HCC): Primary | ICD-10-CM

## 2022-12-06 DIAGNOSIS — Z85.46 PERSONAL HISTORY OF MALIGNANT NEOPLASM OF PROSTATE: ICD-10-CM

## 2022-12-06 DIAGNOSIS — I48.0 PAROXYSMAL ATRIAL FIBRILLATION (HCC): ICD-10-CM

## 2022-12-06 DIAGNOSIS — J44.9 CHRONIC OBSTRUCTIVE PULMONARY DISEASE, UNSPECIFIED COPD TYPE (HCC): ICD-10-CM

## 2022-12-06 DIAGNOSIS — I25.10 CORONARY ARTERY DISEASE INVOLVING NATIVE CORONARY ARTERY OF NATIVE HEART WITHOUT ANGINA PECTORIS: ICD-10-CM

## 2022-12-06 DIAGNOSIS — N18.30 STAGE 3 CHRONIC KIDNEY DISEASE, UNSPECIFIED WHETHER STAGE 3A OR 3B CKD (HCC): ICD-10-CM

## 2022-12-06 DIAGNOSIS — E78.00 PURE HYPERCHOLESTEROLEMIA: ICD-10-CM

## 2022-12-06 DIAGNOSIS — Z23 NEEDS FLU SHOT: ICD-10-CM

## 2022-12-06 DIAGNOSIS — Z95.0 PACEMAKER: ICD-10-CM

## 2022-12-06 PROBLEM — C44.311 BASAL CELL CARCINOMA OF NOSE: Status: ACTIVE | Noted: 2022-12-06

## 2022-12-06 LAB
ALBUMIN SERPL-MCNC: 3.4 G/DL (ref 3.2–4.6)
ALBUMIN/GLOB SERPL: 1.1 {RATIO} (ref 0.4–1.6)
ALP SERPL-CCNC: 112 U/L (ref 50–136)
ALT SERPL-CCNC: 18 U/L (ref 12–65)
ANION GAP SERPL CALC-SCNC: 2 MMOL/L (ref 2–11)
APPEARANCE UR: CLEAR
AST SERPL-CCNC: 16 U/L (ref 15–37)
BASOPHILS # BLD: 0.1 K/UL (ref 0–0.2)
BASOPHILS NFR BLD: 1 % (ref 0–2)
BILIRUB SERPL-MCNC: 0.9 MG/DL (ref 0.2–1.1)
BILIRUB UR QL: NEGATIVE
BUN SERPL-MCNC: 8 MG/DL (ref 8–23)
CALCIUM SERPL-MCNC: 8.9 MG/DL (ref 8.3–10.4)
CHLORIDE SERPL-SCNC: 109 MMOL/L (ref 101–110)
CHOLEST SERPL-MCNC: 148 MG/DL
CO2 SERPL-SCNC: 31 MMOL/L (ref 21–32)
COLOR UR: NORMAL
CREAT SERPL-MCNC: 1.2 MG/DL (ref 0.8–1.5)
DIFFERENTIAL METHOD BLD: ABNORMAL
EOSINOPHIL # BLD: 0.6 K/UL (ref 0–0.8)
EOSINOPHIL NFR BLD: 9 % (ref 0.5–7.8)
ERYTHROCYTE [DISTWIDTH] IN BLOOD BY AUTOMATED COUNT: 14.5 % (ref 11.9–14.6)
GLOBULIN SER CALC-MCNC: 3 G/DL (ref 2.8–4.5)
GLUCOSE SERPL-MCNC: 93 MG/DL (ref 65–100)
GLUCOSE UR STRIP.AUTO-MCNC: NEGATIVE MG/DL
HCT VFR BLD AUTO: 50.4 % (ref 41.1–50.3)
HDLC SERPL-MCNC: 33 MG/DL (ref 40–60)
HDLC SERPL: 4.5 {RATIO}
HGB BLD-MCNC: 16.7 G/DL (ref 13.6–17.2)
HGB UR QL STRIP: NEGATIVE
IMM GRANULOCYTES # BLD AUTO: 0 K/UL (ref 0–0.5)
IMM GRANULOCYTES NFR BLD AUTO: 0 % (ref 0–5)
KETONES UR QL STRIP.AUTO: NEGATIVE MG/DL
LDLC SERPL CALC-MCNC: 79 MG/DL
LEUKOCYTE ESTERASE UR QL STRIP.AUTO: NEGATIVE
LYMPHOCYTES # BLD: 2.3 K/UL (ref 0.5–4.6)
LYMPHOCYTES NFR BLD: 34 % (ref 13–44)
MCH RBC QN AUTO: 33.3 PG (ref 26.1–32.9)
MCHC RBC AUTO-ENTMCNC: 33.1 G/DL (ref 31.4–35)
MCV RBC AUTO: 100.6 FL (ref 82–102)
MONOCYTES # BLD: 0.5 K/UL (ref 0.1–1.3)
MONOCYTES NFR BLD: 8 % (ref 4–12)
NEUTS SEG # BLD: 3.2 K/UL (ref 1.7–8.2)
NEUTS SEG NFR BLD: 48 % (ref 43–78)
NITRITE UR QL STRIP.AUTO: NEGATIVE
NRBC # BLD: 0 K/UL (ref 0–0.2)
PH UR STRIP: 6.5 [PH] (ref 5–9)
PLATELET # BLD AUTO: 172 K/UL (ref 150–450)
PMV BLD AUTO: 10.3 FL (ref 9.4–12.3)
POTASSIUM SERPL-SCNC: 4.1 MMOL/L (ref 3.5–5.1)
PROT SERPL-MCNC: 6.4 G/DL (ref 6.3–8.2)
PROT UR STRIP-MCNC: NEGATIVE MG/DL
RBC # BLD AUTO: 5.01 M/UL (ref 4.23–5.6)
SODIUM SERPL-SCNC: 142 MMOL/L (ref 133–143)
SP GR UR REFRACTOMETRY: 1.01 (ref 1–1.02)
TRIGL SERPL-MCNC: 180 MG/DL (ref 35–150)
TSH W FREE THYROID IF ABNORMAL: 0.76 UIU/ML (ref 0.36–3.74)
UROBILINOGEN UR QL STRIP.AUTO: 1 EU/DL (ref 0.2–1)
VLDLC SERPL CALC-MCNC: 36 MG/DL (ref 6–23)
WBC # BLD AUTO: 6.8 K/UL (ref 4.3–11.1)

## 2022-12-06 PROCEDURE — 90694 VACC AIIV4 NO PRSRV 0.5ML IM: CPT | Performed by: INTERNAL MEDICINE

## 2022-12-06 PROCEDURE — G0008 ADMIN INFLUENZA VIRUS VAC: HCPCS | Performed by: INTERNAL MEDICINE

## 2022-12-06 PROCEDURE — 4004F PT TOBACCO SCREEN RCVD TLK: CPT | Performed by: INTERNAL MEDICINE

## 2022-12-06 PROCEDURE — 99214 OFFICE O/P EST MOD 30 MIN: CPT | Performed by: INTERNAL MEDICINE

## 2022-12-06 PROCEDURE — G8417 CALC BMI ABV UP PARAM F/U: HCPCS | Performed by: INTERNAL MEDICINE

## 2022-12-06 PROCEDURE — G8427 DOCREV CUR MEDS BY ELIG CLIN: HCPCS | Performed by: INTERNAL MEDICINE

## 2022-12-06 PROCEDURE — 3023F SPIROM DOC REV: CPT | Performed by: INTERNAL MEDICINE

## 2022-12-06 PROCEDURE — 1123F ACP DISCUSS/DSCN MKR DOCD: CPT | Performed by: INTERNAL MEDICINE

## 2022-12-06 PROCEDURE — G8484 FLU IMMUNIZE NO ADMIN: HCPCS | Performed by: INTERNAL MEDICINE

## 2022-12-06 ASSESSMENT — PATIENT HEALTH QUESTIONNAIRE - PHQ9
9. THOUGHTS THAT YOU WOULD BE BETTER OFF DEAD, OR OF HURTING YOURSELF: 0
7. TROUBLE CONCENTRATING ON THINGS, SUCH AS READING THE NEWSPAPER OR WATCHING TELEVISION: 0
5. POOR APPETITE OR OVEREATING: 0
SUM OF ALL RESPONSES TO PHQ QUESTIONS 1-9: 2
8. MOVING OR SPEAKING SO SLOWLY THAT OTHER PEOPLE COULD HAVE NOTICED. OR THE OPPOSITE, BEING SO FIGETY OR RESTLESS THAT YOU HAVE BEEN MOVING AROUND A LOT MORE THAN USUAL: 0
2. FEELING DOWN, DEPRESSED OR HOPELESS: 0
3. TROUBLE FALLING OR STAYING ASLEEP: 1
4. FEELING TIRED OR HAVING LITTLE ENERGY: 1
1. LITTLE INTEREST OR PLEASURE IN DOING THINGS: 0
SUM OF ALL RESPONSES TO PHQ QUESTIONS 1-9: 2
6. FEELING BAD ABOUT YOURSELF - OR THAT YOU ARE A FAILURE OR HAVE LET YOURSELF OR YOUR FAMILY DOWN: 0
SUM OF ALL RESPONSES TO PHQ QUESTIONS 1-9: 2
SUM OF ALL RESPONSES TO PHQ9 QUESTIONS 1 & 2: 0
10. IF YOU CHECKED OFF ANY PROBLEMS, HOW DIFFICULT HAVE THESE PROBLEMS MADE IT FOR YOU TO DO YOUR WORK, TAKE CARE OF THINGS AT HOME, OR GET ALONG WITH OTHER PEOPLE: 0
SUM OF ALL RESPONSES TO PHQ QUESTIONS 1-9: 2

## 2022-12-06 ASSESSMENT — ENCOUNTER SYMPTOMS
NAUSEA: 0
WHEEZING: 0
VOMITING: 0
SHORTNESS OF BREATH: 0
CONSTIPATION: 0
DIARRHEA: 0
COUGH: 1

## 2022-12-06 NOTE — PROGRESS NOTES
12/6/2022 11:32 AM  Location:[facility] [address] [facility]  [place]  Patient #:  [id number]  YOB: 1934            History of Present Illness     Chief Complaint   Patient presents with    6 Month Follow-Up    Leg Pain     Complains with bilateral leg pain - having unsteady gait with some dizziness. Incontinence       Mr. Dayron Browne is a 80 y.o. male  who presents for the above. Is coughing up a good bit of phlegm that is discolored. Has been coughing up a fair amount for the past six months. 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    left    Atherosclerosis of native arteries of the extremities with intermittent claudication 7/14/2015    Atrioventricular block, complete ([address] 7/14/2015    S/p pacer    Backache, unspecified 7/14/2015    Bladder neck obstruction 7/14/2015    Brachial neuritis or radiculitis NOS 7/14/2015    Bronchiectasis with acute exacerbation ([address] 7/14/2015    Cancer ([address]     prostate    Cardiac pacemaker 3/23/2016    Carpal tunnel syndrome 7/14/2015    Chest pain, unspecified 7/14/2015    Chronic lung disease     Chronic otitis externa 8/4/2016    Chronic renal disease, stage III [facility]) [638340] 7/28/2022    COPD (chronic obstructive pulmonary disease) ([address] 3/23/2016    Coronary artery disease involving native coronary artery of native heart without angina pectoris 08/04/2011    LHC:mild to moderate CAD with normal LV EF.     Dyspepsia and other specified disorders of function of stomach 7/14/2015    Emphysema of lung ([address] 8/4/2016    Encounter for long-term (current) use of other medications 7/14/2015    Erectile dysfunction 3/23/2016    Fibrosis, idiopathic pulmonary ([facility] 2015    Generalized anxiety disorder 2015    Hypertrophy of breast 2015    Insomnia, unspecified 2015    Lesion of ulnar nerve 2015    Malignant neoplasm of prostate ([facility] 2015    Followed by dr Milton Barragan    Migraine, unspecified, without mention of intractable migraine without mention of status migrainosus 2015    Other and unspecified hyperlipidemia 2015    Other constipation 2015    Other dyspnea and respiratory abnormality 2015    Other malaise and fatigue 2015    Pacemaker     Panic attacks 2016    Paroxysmal atrial fibrillation ([facility] 3/23/2016    Peyronie's disease 2015    Pneumonia, organism unspecified(486) 2015    Psychiatric disorder     anxiety attacks    PUD (peptic ulcer disease)     RBBB (right bundle branch block)     SNHL (sensorineural hearing loss) 2016    Unspecified asthma(493.90) 2015     Social History     Socioeconomic History    Marital status:      Spouse name: None    Number of children: None    Years of education: None    Highest education level: None   Tobacco Use    Smoking status: Former     Packs/day: 5.00     Types: Cigarettes     Quit date: 8/15/1991     Years since quittin.3    Smokeless tobacco: Current    Tobacco comments:     Quit smoking: quit in ; cans of smokeless tobacco per week   Substance and Sexual Activity    Alcohol use: No     Alcohol/week: 0.0 standard drinks    Drug use: Yes     Types: Prescription     Social Determinants of Health     Financial Resource Strain: Low Risk     Difficulty of Paying Living Expenses: Not hard at all   Food Insecurity: No Food Insecurity    Worried About Running Out of Food in the Last Year: Never true    Ran Out of Food in the Last Year: Never true     Past Surgical History:   Procedure Laterality Date    CHOLECYSTECTOMY  2003    OTHER SURGICAL HISTORY      Prostate Cancer    PACEMAKER TURP      VASECTOMY       Current Outpatient Medications   Medication Sig Dispense Refill    albuterol-ipratropium (COMBIVENT RESPIMAT)  MCG/ACT AERS inhaler Inhale 1 puff into the lungs every 6 hours as needed for Wheezing 1 each 5    gabapentin (NEURONTIN) 600 MG tablet Take 600 mg by mouth daily. traZODone (DESYREL) 50 MG tablet Take 50 mg by mouth nightly      metoprolol succinate (TOPROL XL) 25 MG extended release tablet Take 1 tablet by mouth daily 30 tablet 5    LORazepam (ATIVAN) 1 MG tablet Take 1 tablet by mouth 2 times daily as needed for Anxiety for up to 180 days. TAKE 1 TAB BY MOUTH 2 TIMES DAILY AS NEEDED FOR ANXIETY. MAX 2 PER DAY 60 tablet 5    melatonin 3 MG TABS tablet Take 3 mg by mouth in the morning. Garlic 4567 MG CAPS Take by mouth daily      Multiple Vitamins-Minerals (CENTRUM SILVER 50+MEN PO) Take by mouth daily      docusate sodium (COLACE) 250 MG capsule Take 250 mg by mouth daily      Magnesium 500 MG CAPS Take by mouth daily      acetaminophen (TYLENOL) 500 MG tablet Take 1,000 mg by mouth every 6 hours as needed      albuterol sulfate  (90 Base) MCG/ACT inhaler Inhale 2 puffs into the lungs every 4 hours as needed      fluticasone (FLONASE) 50 MCG/ACT nasal spray 2 sprays by Nasal route daily as needed      fluticasone (FLOVENT HFA) 44 MCG/ACT inhaler Inhale 2 puffs into the lungs 2 times daily      ibuprofen (ADVIL;MOTRIN) 200 MG tablet Take 400 mg by mouth every 8 hours as needed      levothyroxine (SYNTHROID) 50 MCG tablet TAKE 1 TABLET BY MOUTH EVERY DAY BEFORE BREAKFAST      meclizine (ANTIVERT) 25 MG tablet Take 25 mg by mouth 3 times daily as needed      nitroGLYCERIN (NITROSTAT) 0.4 MG SL tablet Place 0.4 mg under the tongue      potassium gluconate 550 mg tablet Take 99 mg by mouth daily       No current facility-administered medications for this visit.      Health Maintenance   Topic Date Due    COVID-19 Vaccine (1) Never done    Shingles vaccine (2 of 2) 11/21/2019    Prostate Specific Antigen (PSA) Screening or Monitoring  04/15/2022    DTaP/Tdap/Td vaccine (1 - Tdap) 07/06/2022    Flu vaccine (1) 08/01/2022    Annual Wellness Visit (AWV)  10/20/2022    Depression Monitoring  08/03/2023    Pneumococcal 65+ years Vaccine  Completed    Hepatitis A vaccine  Aged Out    Hib vaccine  Aged Out    Meningococcal (ACWY) vaccine  Aged Out     Family History   Problem Relation Age of Onset    Heart Attack Mother     Kidney Disease Father     Diabetes Sister              Review of Systems  Review of Systems   Constitutional:  Negative for chills and fever. Respiratory:  Positive for cough. Negative for shortness of breath and wheezing. Cardiovascular:  Negative for chest pain and palpitations. Gastrointestinal:  Negative for constipation, diarrhea, nausea and vomiting. Genitourinary:  Positive for frequency (has incontinence). Musculoskeletal:  Positive for gait problem (legs don't cooperate and are painful). /62 (Site: Left Upper Arm, Position: Sitting, Cuff Size: Medium Adult)   Pulse 68   Resp 16   Ht 5' 4\" (1.626 m)   Wt 152 lb (68.9 kg)   BMI 26.09 kg/m²       Physical Exam    Physical Exam  Constitutional:       Appearance: Normal appearance. HENT:      Head: Normocephalic and atraumatic. Nose: Congestion present. Neck:      Vascular: No carotid bruit. Cardiovascular:      Rate and Rhythm: Normal rate and regular rhythm. Pulmonary:      Effort: Pulmonary effort is normal.      Breath sounds: Normal breath sounds. No wheezing or rales. Abdominal:      General: Abdomen is flat. There is no distension. Palpations: Abdomen is soft. Tenderness: There is no abdominal tenderness. Musculoskeletal:      Right lower leg: No edema. Left lower leg: No edema. Neurological:      General: No focal deficit present. Mental Status: He is alert and oriented to person, place, and time.    Psychiatric:         Mood and Affect: Mood normal.         Behavior: Behavior normal.         Assessment & Plan    Current Outpatient Medications   Medication Sig Dispense Refill    albuterol-ipratropium (COMBIVENT RESPIMAT)  MCG/ACT AERS inhaler Inhale 1 puff into the lungs every 6 hours as needed for Wheezing 1 each 5    gabapentin (NEURONTIN) 600 MG tablet Take 600 mg by mouth daily. traZODone (DESYREL) 50 MG tablet Take 50 mg by mouth nightly      metoprolol succinate (TOPROL XL) 25 MG extended release tablet Take 1 tablet by mouth daily 30 tablet 5    LORazepam (ATIVAN) 1 MG tablet Take 1 tablet by mouth 2 times daily as needed for Anxiety for up to 180 days. TAKE 1 TAB BY MOUTH 2 TIMES DAILY AS NEEDED FOR ANXIETY. MAX 2 PER DAY 60 tablet 5    melatonin 3 MG TABS tablet Take 3 mg by mouth in the morning. Garlic 4499 MG CAPS Take by mouth daily      Multiple Vitamins-Minerals (CENTRUM SILVER 50+MEN PO) Take by mouth daily      docusate sodium (COLACE) 250 MG capsule Take 250 mg by mouth daily      Magnesium 500 MG CAPS Take by mouth daily      acetaminophen (TYLENOL) 500 MG tablet Take 1,000 mg by mouth every 6 hours as needed      albuterol sulfate  (90 Base) MCG/ACT inhaler Inhale 2 puffs into the lungs every 4 hours as needed      fluticasone (FLONASE) 50 MCG/ACT nasal spray 2 sprays by Nasal route daily as needed      fluticasone (FLOVENT HFA) 44 MCG/ACT inhaler Inhale 2 puffs into the lungs 2 times daily      ibuprofen (ADVIL;MOTRIN) 200 MG tablet Take 400 mg by mouth every 8 hours as needed      levothyroxine (SYNTHROID) 50 MCG tablet TAKE 1 TABLET BY MOUTH EVERY DAY BEFORE BREAKFAST      meclizine (ANTIVERT) 25 MG tablet Take 25 mg by mouth 3 times daily as needed      nitroGLYCERIN (NITROSTAT) 0.4 MG SL tablet Place 0.4 mg under the tongue      potassium gluconate 550 mg tablet Take 99 mg by mouth daily       No current facility-administered medications for this visit.        Orders Placed This Encounter Procedures    Influenza, FLUAD, (age 72 y+), IM, Preservative Free, 0.5 mL    Urinalysis     Standing Status:   Future     Standing Expiration Date:   12/6/2023    Comprehensive Metabolic Panel     Standing Status:   Future     Standing Expiration Date:   12/6/2023    TSH with Reflex     Standing Status:   Future     Standing Expiration Date:   12/6/2023    Lipid Panel     Standing Status:   Future     Standing Expiration Date:   12/6/2023    CBC with Auto Differential     Standing Status:   Future     Standing Expiration Date:   12/6/2023    PSA, Diagnostic     Standing Status:   Future     Standing Expiration Date:   12/6/2023       Orders Placed This Encounter   Medications    albuterol-ipratropium (COMBIVENT RESPIMAT)  MCG/ACT AERS inhaler     Sig: Inhale 1 puff into the lungs every 6 hours as needed for Wheezing     Dispense:  1 each     Refill:  5         Medications Discontinued During This Encounter   Medication Reason    nitroGLYCERIN (NITROSTAT) 0.4 MG SL tablet LIST CLEANUP        Diagnosis Orders   1. Atherosclerosis of native arteries of extremities with intermittent claudication, bilateral legs (HCC)        2. Paroxysmal atrial fibrillation ([address]        3. Pacemaker        4. Coronary artery disease involving native coronary artery of native heart without angina pectoris        5. Chronic obstructive pulmonary disease, unspecified COPD type ([address]        6. Pure hypercholesterolemia  TSH with Reflex    Lipid Panel      7. Stage 3 chronic kidney disease, unspecified whether stage 3a or 3b CKD (HCC)  Urinalysis    Comprehensive Metabolic Panel    CBC with Auto Differential      8. Personal history of malignant neoplasm of prostate  PSA, Diagnostic      9. Needs flu shot  Influenza, FLUAD, (age 72 y+), IM, Preservative Free, 0.5 mL         Is doing fairly well physically and emotionally. Will discuss labs over the phone. Encouraged him to consider PT for his gait issues.   Will discuss labs over the phone. Has good family support. Encouraged him to try the Respimat inhaler above to help with expectoration. Knows to keep a low threshold for reaching out with any worsening symptoms. Vitals look good. Follow-up as above or earlier if needed. Return in about 4 months (around 4/6/2023) for AWV.           Marie Jefferson MD

## 2022-12-07 LAB — PSA SERPL-MCNC: 0.3 NG/ML

## 2022-12-07 NOTE — RESULT ENCOUNTER NOTE
Labs are stable. Continue your current doses of medications. Keep up the good work. Thanks.   Martine Danielson

## 2022-12-12 ENCOUNTER — TELEPHONE (OUTPATIENT)
Dept: INTERNAL MEDICINE CLINIC | Facility: CLINIC | Age: 87
End: 2022-12-12

## 2022-12-12 NOTE — TELEPHONE ENCOUNTER
Chica Haney   12/7/2022  2:32 PM EST Back to [facility] for a return call    Onel Saez MD   12/7/2022  1:28 PM EST       Labs are stable. Continue your current doses of medications. Keep up the good work. Thanks.   Martine Graham 33

## 2023-01-24 DIAGNOSIS — Z95.0 PACEMAKER: ICD-10-CM

## 2023-01-24 DIAGNOSIS — I48.0 PAROXYSMAL ATRIAL FIBRILLATION (HCC): Primary | ICD-10-CM

## 2023-01-24 DIAGNOSIS — I44.2 ATRIOVENTRICULAR BLOCK, COMPLETE (HCC): ICD-10-CM

## 2023-02-21 DIAGNOSIS — F41.9 ANXIETY: ICD-10-CM

## 2023-02-21 RX ORDER — LORAZEPAM 1 MG/1
TABLET ORAL
Qty: 60 TABLET | Refills: 5 | Status: SHIPPED | OUTPATIENT
Start: 2023-02-21 | End: 2023-08-20

## 2023-03-13 DIAGNOSIS — Z95.0 PACEMAKER: ICD-10-CM

## 2023-03-13 DIAGNOSIS — I44.2 ATRIOVENTRICULAR BLOCK, COMPLETE (HCC): ICD-10-CM

## 2023-03-17 RX ORDER — LEVOTHYROXINE SODIUM 0.05 MG/1
TABLET ORAL
Qty: 90 TABLET | Refills: 3 | Status: SHIPPED | OUTPATIENT
Start: 2023-03-17

## 2023-04-06 ENCOUNTER — OFFICE VISIT (OUTPATIENT)
Dept: INTERNAL MEDICINE CLINIC | Facility: CLINIC | Age: 88
End: 2023-04-06
Payer: MEDICARE

## 2023-04-06 VITALS — BODY MASS INDEX: 26.46 KG/M2 | HEIGHT: 64 IN | WEIGHT: 155 LBS | RESPIRATION RATE: 16 BRPM

## 2023-04-06 DIAGNOSIS — I47.1 PAT (PAROXYSMAL ATRIAL TACHYCARDIA) (HCC): ICD-10-CM

## 2023-04-06 DIAGNOSIS — Z95.0 PACEMAKER: ICD-10-CM

## 2023-04-06 DIAGNOSIS — M54.50 CHRONIC LOW BACK PAIN, UNSPECIFIED BACK PAIN LATERALITY, UNSPECIFIED WHETHER SCIATICA PRESENT: ICD-10-CM

## 2023-04-06 DIAGNOSIS — I25.10 CORONARY ARTERY DISEASE INVOLVING NATIVE CORONARY ARTERY OF NATIVE HEART WITHOUT ANGINA PECTORIS: ICD-10-CM

## 2023-04-06 DIAGNOSIS — R32 URINARY INCONTINENCE, UNSPECIFIED TYPE: ICD-10-CM

## 2023-04-06 DIAGNOSIS — J44.9 CHRONIC OBSTRUCTIVE PULMONARY DISEASE, UNSPECIFIED COPD TYPE (HCC): ICD-10-CM

## 2023-04-06 DIAGNOSIS — Z00.00 MEDICARE ANNUAL WELLNESS VISIT, SUBSEQUENT: ICD-10-CM

## 2023-04-06 DIAGNOSIS — R42 DIZZINESS: ICD-10-CM

## 2023-04-06 DIAGNOSIS — N18.30 STAGE 3 CHRONIC KIDNEY DISEASE, UNSPECIFIED WHETHER STAGE 3A OR 3B CKD (HCC): ICD-10-CM

## 2023-04-06 DIAGNOSIS — M79.604 PAIN IN BOTH LOWER EXTREMITIES: Primary | ICD-10-CM

## 2023-04-06 DIAGNOSIS — M79.605 PAIN IN BOTH LOWER EXTREMITIES: Primary | ICD-10-CM

## 2023-04-06 DIAGNOSIS — G89.29 CHRONIC LOW BACK PAIN, UNSPECIFIED BACK PAIN LATERALITY, UNSPECIFIED WHETHER SCIATICA PRESENT: ICD-10-CM

## 2023-04-06 DIAGNOSIS — Z85.46 HISTORY OF MALIGNANT NEOPLASM OF PROSTATE: ICD-10-CM

## 2023-04-06 DIAGNOSIS — F41.9 ANXIETY: ICD-10-CM

## 2023-04-06 PROBLEM — I70.213 ATHEROSCLEROSIS OF NATIVE ARTERIES OF EXTREMITIES WITH INTERMITTENT CLAUDICATION, BILATERAL LEGS (HCC): Status: RESOLVED | Noted: 2022-08-03 | Resolved: 2023-04-06

## 2023-04-06 LAB
ALBUMIN SERPL-MCNC: 3.4 G/DL (ref 3.2–4.6)
ALBUMIN/GLOB SERPL: 1.2 (ref 0.4–1.6)
ALP SERPL-CCNC: 116 U/L (ref 50–136)
ALT SERPL-CCNC: 14 U/L (ref 12–65)
ANION GAP SERPL CALC-SCNC: 3 MMOL/L (ref 2–11)
AST SERPL-CCNC: 19 U/L (ref 15–37)
BASOPHILS # BLD: 0.1 K/UL (ref 0–0.2)
BASOPHILS NFR BLD: 2 % (ref 0–2)
BILIRUB SERPL-MCNC: 0.8 MG/DL (ref 0.2–1.1)
BUN SERPL-MCNC: 12 MG/DL (ref 8–23)
CALCIUM SERPL-MCNC: 8.9 MG/DL (ref 8.3–10.4)
CHLORIDE SERPL-SCNC: 107 MMOL/L (ref 101–110)
CO2 SERPL-SCNC: 32 MMOL/L (ref 21–32)
CREAT SERPL-MCNC: 1 MG/DL (ref 0.8–1.5)
DIFFERENTIAL METHOD BLD: ABNORMAL
EOSINOPHIL # BLD: 0.5 K/UL (ref 0–0.8)
EOSINOPHIL NFR BLD: 9 % (ref 0.5–7.8)
ERYTHROCYTE [DISTWIDTH] IN BLOOD BY AUTOMATED COUNT: 14.7 % (ref 11.9–14.6)
GLOBULIN SER CALC-MCNC: 2.8 G/DL (ref 2.8–4.5)
GLUCOSE SERPL-MCNC: 93 MG/DL (ref 65–100)
HCT VFR BLD AUTO: 45 % (ref 41.1–50.3)
HGB BLD-MCNC: 15.2 G/DL (ref 13.6–17.2)
IMM GRANULOCYTES # BLD AUTO: 0 K/UL (ref 0–0.5)
IMM GRANULOCYTES NFR BLD AUTO: 0 % (ref 0–5)
LYMPHOCYTES # BLD: 2.4 K/UL (ref 0.5–4.6)
LYMPHOCYTES NFR BLD: 42 % (ref 13–44)
MCH RBC QN AUTO: 36 PG (ref 26.1–32.9)
MCHC RBC AUTO-ENTMCNC: 33.8 G/DL (ref 31.4–35)
MCV RBC AUTO: 106.6 FL (ref 82–102)
MONOCYTES # BLD: 0.5 K/UL (ref 0.1–1.3)
MONOCYTES NFR BLD: 9 % (ref 4–12)
NEUTS SEG # BLD: 2.1 K/UL (ref 1.7–8.2)
NEUTS SEG NFR BLD: 38 % (ref 43–78)
NRBC # BLD: 0 K/UL (ref 0–0.2)
PLATELET # BLD AUTO: 177 K/UL (ref 150–450)
PMV BLD AUTO: 9.9 FL (ref 9.4–12.3)
POTASSIUM SERPL-SCNC: 4.5 MMOL/L (ref 3.5–5.1)
PROT SERPL-MCNC: 6.2 G/DL (ref 6.3–8.2)
RBC # BLD AUTO: 4.22 M/UL (ref 4.23–5.6)
SODIUM SERPL-SCNC: 142 MMOL/L (ref 133–143)
WBC # BLD AUTO: 5.5 K/UL (ref 4.3–11.1)

## 2023-04-06 PROCEDURE — G8427 DOCREV CUR MEDS BY ELIG CLIN: HCPCS | Performed by: INTERNAL MEDICINE

## 2023-04-06 PROCEDURE — 1123F ACP DISCUSS/DSCN MKR DOCD: CPT | Performed by: INTERNAL MEDICINE

## 2023-04-06 PROCEDURE — 99213 OFFICE O/P EST LOW 20 MIN: CPT | Performed by: INTERNAL MEDICINE

## 2023-04-06 PROCEDURE — 3023F SPIROM DOC REV: CPT | Performed by: INTERNAL MEDICINE

## 2023-04-06 PROCEDURE — G0439 PPPS, SUBSEQ VISIT: HCPCS | Performed by: INTERNAL MEDICINE

## 2023-04-06 PROCEDURE — 4004F PT TOBACCO SCREEN RCVD TLK: CPT | Performed by: INTERNAL MEDICINE

## 2023-04-06 PROCEDURE — G8417 CALC BMI ABV UP PARAM F/U: HCPCS | Performed by: INTERNAL MEDICINE

## 2023-04-06 RX ORDER — ZOSTER VACCINE RECOMBINANT, ADJUVANTED 50 MCG/0.5
0.5 KIT INTRAMUSCULAR SEE ADMIN INSTRUCTIONS
Qty: 0.5 ML | Refills: 0 | Status: SHIPPED | OUTPATIENT
Start: 2023-04-06 | End: 2023-04-06

## 2023-04-06 RX ORDER — ZOSTER VACCINE RECOMBINANT, ADJUVANTED 50 MCG/0.5
0.5 KIT INTRAMUSCULAR SEE ADMIN INSTRUCTIONS
Qty: 0.5 ML | Refills: 0 | Status: SHIPPED | OUTPATIENT
Start: 2023-04-06 | End: 2023-10-03

## 2023-04-06 SDOH — ECONOMIC STABILITY: HOUSING INSECURITY
IN THE LAST 12 MONTHS, WAS THERE A TIME WHEN YOU DID NOT HAVE A STEADY PLACE TO SLEEP OR SLEPT IN A SHELTER (INCLUDING NOW)?: NO

## 2023-04-06 SDOH — ECONOMIC STABILITY: INCOME INSECURITY: HOW HARD IS IT FOR YOU TO PAY FOR THE VERY BASICS LIKE FOOD, HOUSING, MEDICAL CARE, AND HEATING?: SOMEWHAT HARD

## 2023-04-06 SDOH — ECONOMIC STABILITY: FOOD INSECURITY: WITHIN THE PAST 12 MONTHS, YOU WORRIED THAT YOUR FOOD WOULD RUN OUT BEFORE YOU GOT MONEY TO BUY MORE.: NEVER TRUE

## 2023-04-06 SDOH — ECONOMIC STABILITY: FOOD INSECURITY: WITHIN THE PAST 12 MONTHS, THE FOOD YOU BOUGHT JUST DIDN'T LAST AND YOU DIDN'T HAVE MONEY TO GET MORE.: NEVER TRUE

## 2023-04-06 ASSESSMENT — PATIENT HEALTH QUESTIONNAIRE - PHQ9
7. TROUBLE CONCENTRATING ON THINGS, SUCH AS READING THE NEWSPAPER OR WATCHING TELEVISION: 1
1. LITTLE INTEREST OR PLEASURE IN DOING THINGS: 0
6. FEELING BAD ABOUT YOURSELF - OR THAT YOU ARE A FAILURE OR HAVE LET YOURSELF OR YOUR FAMILY DOWN: 0
SUM OF ALL RESPONSES TO PHQ QUESTIONS 1-9: 3
SUM OF ALL RESPONSES TO PHQ QUESTIONS 1-9: 3
8. MOVING OR SPEAKING SO SLOWLY THAT OTHER PEOPLE COULD HAVE NOTICED. OR THE OPPOSITE, BEING SO FIGETY OR RESTLESS THAT YOU HAVE BEEN MOVING AROUND A LOT MORE THAN USUAL: 0
2. FEELING DOWN, DEPRESSED OR HOPELESS: 0
10. IF YOU CHECKED OFF ANY PROBLEMS, HOW DIFFICULT HAVE THESE PROBLEMS MADE IT FOR YOU TO DO YOUR WORK, TAKE CARE OF THINGS AT HOME, OR GET ALONG WITH OTHER PEOPLE: 1
3. TROUBLE FALLING OR STAYING ASLEEP: 1
9. THOUGHTS THAT YOU WOULD BE BETTER OFF DEAD, OR OF HURTING YOURSELF: 0
SUM OF ALL RESPONSES TO PHQ9 QUESTIONS 1 & 2: 0
5. POOR APPETITE OR OVEREATING: 0
4. FEELING TIRED OR HAVING LITTLE ENERGY: 1
SUM OF ALL RESPONSES TO PHQ QUESTIONS 1-9: 3
SUM OF ALL RESPONSES TO PHQ QUESTIONS 1-9: 3

## 2023-04-06 ASSESSMENT — ENCOUNTER SYMPTOMS
NAUSEA: 0
SHORTNESS OF BREATH: 0
CONSTIPATION: 0
VOMITING: 0
DIARRHEA: 0
COUGH: 1
BACK PAIN: 1
WHEEZING: 1

## 2023-04-06 ASSESSMENT — LIFESTYLE VARIABLES
HOW MANY STANDARD DRINKS CONTAINING ALCOHOL DO YOU HAVE ON A TYPICAL DAY: PATIENT DOES NOT DRINK
HOW OFTEN DO YOU HAVE A DRINK CONTAINING ALCOHOL: NEVER

## 2023-04-06 NOTE — PATIENT INSTRUCTIONS
the amount directed each day. Make sure you take aspirin and not another kind of pain reliever, such as acetaminophen (Tylenol). When should you call for help? Call 911 if you have symptoms of a heart attack. These may include:    Chest pain or pressure, or a strange feeling in the chest.     Sweating.     Shortness of breath.     Pain, pressure, or a strange feeling in the back, neck, jaw, or upper belly or in one or both shoulders or arms.     Lightheadedness or sudden weakness.     A fast or irregular heartbeat. After you call 911, the  may tell you to chew 1 adult-strength or 2 to 4 low-dose aspirin. Wait for an ambulance. Do not try to drive yourself. Watch closely for changes in your health, and be sure to contact your doctor if you have any problems. Where can you learn more? Go to http://www.lam.com/ and enter F075 to learn more about \"A Healthy Heart: Care Instructions. \"  Current as of: September 7, 2022               Content Version: 13.6  © 6228-3016 [employer]. Care instructions adapted under license by [facility] ([facility]). If you have questions about a medical condition or this instruction, always ask your healthcare professional. [place] [address] any warranty or liability for your use of this information. Personalized Preventive Plan for Alexandru Candelaria - 4/6/2023  Medicare offers a range of preventive health benefits. Some of the tests and screenings are paid in full while other may be subject to a deductible, co-insurance, and/or copay. Some of these benefits include a comprehensive review of your medical history including lifestyle, illnesses that may run in your family, and various assessments and screenings as appropriate. After reviewing your medical record and screening and assessments performed today your provider may have ordered immunizations, labs, imaging, and/or referrals for you.   A list of these orders (if

## 2023-04-06 NOTE — PROGRESS NOTES
nervous/anxious. Resp 16   Ht 5' 4\" (1.626 m)   Wt 155 lb (70.3 kg)   BMI 26.61 kg/m²       Physical Exam    Physical Exam  Constitutional:       Appearance: Normal appearance. HENT:      Head: Normocephalic and atraumatic. Nose: Congestion present. Neck:      Vascular: No carotid bruit. Cardiovascular:      Rate and Rhythm: Normal rate and regular rhythm. Pulmonary:      Effort: Pulmonary effort is normal.      Breath sounds: Wheezing (faint and scattered) present. No rales. Abdominal:      General: Abdomen is flat. There is no distension. Palpations: Abdomen is soft. Tenderness: There is no abdominal tenderness. Musculoskeletal:      Right lower leg: No edema. Left lower leg: No edema. Neurological:      General: No focal deficit present. Mental Status: He is alert and oriented to person, place, and time. Gait: Gait abnormal (tentative).    Psychiatric:         Mood and Affect: Mood normal.         Behavior: Behavior normal.         Assessment & Plan    Current Outpatient Medications   Medication Sig Dispense Refill    zoster recombinant adjuvanted vaccine (SHINGRIX) 50 MCG/0.5ML SUSR injection Inject 0.5 mLs into the muscle See Admin Instructions 1 dose now and repeat in 2-6 months 0.5 mL 0    levothyroxine (SYNTHROID) 50 MCG tablet TAKE 1 TABLET BY MOUTH EVERY DAY BEFORE BREAKFAST 90 tablet 3    LORazepam (ATIVAN) 1 MG tablet TAKE 1 TABLET BY MOUTH TWICE A DAY AS NEEDED FOR ANXIETY 60 tablet 5    albuterol-ipratropium (COMBIVENT RESPIMAT)  MCG/ACT AERS inhaler Inhale 1 puff into the lungs every 6 hours as needed for Wheezing 1 each 5    traZODone (DESYREL) 50 MG tablet Take 1 tablet by mouth nightly      metoprolol succinate (TOPROL XL) 25 MG extended release tablet Take 1 tablet by mouth daily 30 tablet 5    melatonin 3 MG TABS tablet Take 1 tablet by mouth daily      Garlic 2597 MG CAPS Take by mouth daily      Multiple Vitamins-Minerals (CENTRUM SILVER

## 2023-06-05 ENCOUNTER — OFFICE VISIT (OUTPATIENT)
Age: 88
End: 2023-06-05
Payer: MEDICARE

## 2023-06-05 VITALS
HEIGHT: 64 IN | HEART RATE: 68 BPM | WEIGHT: 148.6 LBS | BODY MASS INDEX: 25.37 KG/M2 | DIASTOLIC BLOOD PRESSURE: 64 MMHG | SYSTOLIC BLOOD PRESSURE: 132 MMHG

## 2023-06-05 DIAGNOSIS — I25.10 CORONARY ARTERY DISEASE INVOLVING NATIVE CORONARY ARTERY OF NATIVE HEART WITHOUT ANGINA PECTORIS: Primary | ICD-10-CM

## 2023-06-05 DIAGNOSIS — I48.0 PAROXYSMAL ATRIAL FIBRILLATION (HCC): ICD-10-CM

## 2023-06-05 DIAGNOSIS — I47.1 PAT (PAROXYSMAL ATRIAL TACHYCARDIA) (HCC): ICD-10-CM

## 2023-06-05 DIAGNOSIS — Z95.0 PACEMAKER: ICD-10-CM

## 2023-06-05 PROCEDURE — 1123F ACP DISCUSS/DSCN MKR DOCD: CPT | Performed by: INTERNAL MEDICINE

## 2023-06-05 PROCEDURE — G8417 CALC BMI ABV UP PARAM F/U: HCPCS | Performed by: INTERNAL MEDICINE

## 2023-06-05 PROCEDURE — G8427 DOCREV CUR MEDS BY ELIG CLIN: HCPCS | Performed by: INTERNAL MEDICINE

## 2023-06-05 PROCEDURE — 4004F PT TOBACCO SCREEN RCVD TLK: CPT | Performed by: INTERNAL MEDICINE

## 2023-06-05 PROCEDURE — 99214 OFFICE O/P EST MOD 30 MIN: CPT | Performed by: INTERNAL MEDICINE

## 2023-06-05 RX ORDER — METOPROLOL SUCCINATE 25 MG/1
25 TABLET, EXTENDED RELEASE ORAL DAILY
Qty: 90 TABLET | Refills: 3 | Status: SHIPPED | OUTPATIENT
Start: 2023-06-05

## 2023-06-05 ASSESSMENT — ENCOUNTER SYMPTOMS
SPUTUM PRODUCTION: 0
HEMATEMESIS: 0
COLOR CHANGE: 0
SHORTNESS OF BREATH: 0
ABDOMINAL PAIN: 0
CHEST TIGHTNESS: 0
BLURRED VISION: 0
ORTHOPNEA: 0
BOWEL INCONTINENCE: 0
DIARRHEA: 0
WHEEZING: 0
BACK PAIN: 1
HEMATOCHEZIA: 0
HOARSE VOICE: 0

## 2023-06-05 NOTE — PROGRESS NOTES
are normal.      Palpations: Abdomen is soft. Musculoskeletal:         General: Normal range of motion. Cervical back: Normal range of motion and neck supple. Skin:     General: Skin is warm and dry. Neurological:      General: No focal deficit present. Mental Status: He is alert and oriented to person, place, and time. Psychiatric:         Mood and Affect: Mood normal.       Medical problems and test results were reviewed with the patient today. No results found for this or any previous visit (from the past 672 hour(s)). Lab Results   Component Value Date/Time    CHOL 148 12/06/2022 12:05 PM    HDL 33 12/06/2022 12:05 PM    VLDL 22 10/19/2021 10:25 AM     No results found for any visits on 06/05/23. ASSESSMENT and PLAN    Michael Trevino was seen today for coronary artery disease. Diagnoses and all orders for this visit:    Coronary artery disease involving native coronary artery of native heart without angina pectoris:Stable. Continue Toprol and NTG prn.  -     metoprolol succinate (TOPROL XL) 25 MG extended release tablet; Take 1 tablet by mouth daily    PAT (paroxysmal atrial tachycardia) (HCC):Stable. Continue low dose Toprol.  -     metoprolol succinate (TOPROL XL) 25 MG extended release tablet; Take 1 tablet by mouth daily    Pacemaker:Continue device clinic as scheduled. Paroxysmal atrial fibrillation (HCC):Stable. Continue Toprol.  -     metoprolol succinate (TOPROL XL) 25 MG extended release tablet; Take 1 tablet by mouth daily          Disposition:    Return in about 6 months (around 12/5/2023).                 Rimma Ryder MD  6/5/2023  10:50 AM

## 2023-06-27 DIAGNOSIS — Z95.0 PACEMAKER: ICD-10-CM

## 2023-06-27 DIAGNOSIS — I44.2 ATRIOVENTRICULAR BLOCK, COMPLETE (HCC): ICD-10-CM

## 2023-07-31 PROCEDURE — 93294 REM INTERROG EVL PM/LDLS PM: CPT | Performed by: INTERNAL MEDICINE

## 2023-07-31 PROCEDURE — 93296 REM INTERROG EVL PM/IDS: CPT | Performed by: INTERNAL MEDICINE

## 2023-08-14 ENCOUNTER — TELEPHONE (OUTPATIENT)
Dept: INTERNAL MEDICINE CLINIC | Facility: CLINIC | Age: 88
End: 2023-08-14

## 2023-08-14 RX ORDER — ESCITALOPRAM OXALATE 10 MG/1
10 TABLET ORAL DAILY
Qty: 30 TABLET | Refills: 5 | Status: SHIPPED | OUTPATIENT
Start: 2023-08-14 | End: 2023-08-15 | Stop reason: SDUPTHER

## 2023-08-14 NOTE — TELEPHONE ENCOUNTER
Patient's grandson states the lorazepam 1 mg twice daily isn't working anymore. Patient is having panic attacks, he will jerk and get very nervous acting.  They are wanting to know if there is something else he can take or if the lorazepam can be increased

## 2023-08-15 RX ORDER — ESCITALOPRAM OXALATE 10 MG/1
10 TABLET ORAL DAILY
Qty: 30 TABLET | Refills: 5 | Status: SHIPPED | OUTPATIENT
Start: 2023-08-15

## 2023-08-15 NOTE — TELEPHONE ENCOUNTER
I have talked with Mr. Lloyd Tao and he has stated that [facility] in [place] does not have the lexapro. They are on back order. He wants this prescription sent to  [address] [facility] [place].

## 2023-08-16 DIAGNOSIS — F41.9 ANXIETY: ICD-10-CM

## 2023-08-16 RX ORDER — LORAZEPAM 1 MG/1
1 TABLET ORAL 2 TIMES DAILY
Qty: 60 TABLET | Refills: 5 | Status: SHIPPED | OUTPATIENT
Start: 2023-08-16 | End: 2024-02-12

## 2023-11-07 PROCEDURE — 93296 REM INTERROG EVL PM/IDS: CPT | Performed by: INTERNAL MEDICINE

## 2023-11-07 PROCEDURE — 93294 REM INTERROG EVL PM/LDLS PM: CPT | Performed by: INTERNAL MEDICINE

## 2023-11-15 ENCOUNTER — OFFICE VISIT (OUTPATIENT)
Dept: INTERNAL MEDICINE CLINIC | Facility: CLINIC | Age: 88
End: 2023-11-15
Payer: MEDICARE

## 2023-11-15 VITALS
HEART RATE: 71 BPM | WEIGHT: 147 LBS | HEIGHT: 64 IN | SYSTOLIC BLOOD PRESSURE: 164 MMHG | RESPIRATION RATE: 18 BRPM | DIASTOLIC BLOOD PRESSURE: 78 MMHG | BODY MASS INDEX: 25.1 KG/M2

## 2023-11-15 DIAGNOSIS — E78.5 HYPERLIPIDEMIA, UNSPECIFIED HYPERLIPIDEMIA TYPE: ICD-10-CM

## 2023-11-15 DIAGNOSIS — J44.9 CHRONIC OBSTRUCTIVE PULMONARY DISEASE, UNSPECIFIED COPD TYPE (HCC): ICD-10-CM

## 2023-11-15 DIAGNOSIS — M79.605 PAIN IN BOTH LOWER EXTREMITIES: ICD-10-CM

## 2023-11-15 DIAGNOSIS — F41.9 ANXIETY: ICD-10-CM

## 2023-11-15 DIAGNOSIS — R26.81 GAIT INSTABILITY: ICD-10-CM

## 2023-11-15 DIAGNOSIS — I48.0 PAROXYSMAL ATRIAL FIBRILLATION (HCC): Primary | ICD-10-CM

## 2023-11-15 DIAGNOSIS — M79.604 PAIN IN BOTH LOWER EXTREMITIES: ICD-10-CM

## 2023-11-15 DIAGNOSIS — Z95.0 PACEMAKER: ICD-10-CM

## 2023-11-15 DIAGNOSIS — Z85.46 HISTORY OF MALIGNANT NEOPLASM OF PROSTATE: ICD-10-CM

## 2023-11-15 DIAGNOSIS — R32 URINARY INCONTINENCE, UNSPECIFIED TYPE: ICD-10-CM

## 2023-11-15 LAB
ALBUMIN SERPL-MCNC: 3.5 G/DL (ref 3.2–4.6)
ALBUMIN/GLOB SERPL: 1.5 (ref 0.4–1.6)
ALP SERPL-CCNC: 110 U/L (ref 50–136)
ALT SERPL-CCNC: 19 U/L (ref 12–65)
ANION GAP SERPL CALC-SCNC: 5 MMOL/L (ref 2–11)
APPEARANCE UR: CLEAR
AST SERPL-CCNC: 20 U/L (ref 15–37)
BASOPHILS # BLD: 0.1 K/UL (ref 0–0.2)
BASOPHILS NFR BLD: 1 % (ref 0–2)
BILIRUB SERPL-MCNC: 0.7 MG/DL (ref 0.2–1.1)
BILIRUB UR QL: NEGATIVE
BUN SERPL-MCNC: 10 MG/DL (ref 8–23)
CALCIUM SERPL-MCNC: 8.7 MG/DL (ref 8.3–10.4)
CHLORIDE SERPL-SCNC: 107 MMOL/L (ref 101–110)
CHOLEST SERPL-MCNC: 143 MG/DL
CO2 SERPL-SCNC: 30 MMOL/L (ref 21–32)
COLOR UR: NORMAL
CREAT SERPL-MCNC: 0.9 MG/DL (ref 0.8–1.5)
DIFFERENTIAL METHOD BLD: ABNORMAL
EOSINOPHIL # BLD: 0.4 K/UL (ref 0–0.8)
EOSINOPHIL NFR BLD: 7 % (ref 0.5–7.8)
ERYTHROCYTE [DISTWIDTH] IN BLOOD BY AUTOMATED COUNT: 12.7 % (ref 11.9–14.6)
GLOBULIN SER CALC-MCNC: 2.4 G/DL (ref 2.8–4.5)
GLUCOSE SERPL-MCNC: 94 MG/DL (ref 65–100)
GLUCOSE UR STRIP.AUTO-MCNC: NEGATIVE MG/DL
HCT VFR BLD AUTO: 40.3 % (ref 41.1–50.3)
HDLC SERPL-MCNC: 35 MG/DL (ref 40–60)
HDLC SERPL: 4.1
HGB BLD-MCNC: 13.4 G/DL (ref 13.6–17.2)
HGB UR QL STRIP: NEGATIVE
IMM GRANULOCYTES # BLD AUTO: 0 K/UL (ref 0–0.5)
IMM GRANULOCYTES NFR BLD AUTO: 0 % (ref 0–5)
KETONES UR QL STRIP.AUTO: NEGATIVE MG/DL
LDLC SERPL CALC-MCNC: 91 MG/DL
LEUKOCYTE ESTERASE UR QL STRIP.AUTO: NEGATIVE
LYMPHOCYTES # BLD: 1.8 K/UL (ref 0.5–4.6)
LYMPHOCYTES NFR BLD: 31 % (ref 13–44)
MCH RBC QN AUTO: 35.5 PG (ref 26.1–32.9)
MCHC RBC AUTO-ENTMCNC: 33.3 G/DL (ref 31.4–35)
MCV RBC AUTO: 106.9 FL (ref 82–102)
MONOCYTES # BLD: 0.4 K/UL (ref 0.1–1.3)
MONOCYTES NFR BLD: 8 % (ref 4–12)
NEUTS SEG # BLD: 3.1 K/UL (ref 1.7–8.2)
NEUTS SEG NFR BLD: 53 % (ref 43–78)
NITRITE UR QL STRIP.AUTO: NEGATIVE
NRBC # BLD: 0 K/UL (ref 0–0.2)
PH UR STRIP: 7 (ref 5–9)
PLATELET # BLD AUTO: 188 K/UL (ref 150–450)
PMV BLD AUTO: 10.6 FL (ref 9.4–12.3)
POTASSIUM SERPL-SCNC: 3.8 MMOL/L (ref 3.5–5.1)
PROT SERPL-MCNC: 5.9 G/DL (ref 6.3–8.2)
PROT UR STRIP-MCNC: NEGATIVE MG/DL
RBC # BLD AUTO: 3.77 M/UL (ref 4.23–5.6)
SODIUM SERPL-SCNC: 142 MMOL/L (ref 133–143)
SP GR UR REFRACTOMETRY: 1.01 (ref 1–1.02)
TRIGL SERPL-MCNC: 85 MG/DL (ref 35–150)
TSH W FREE THYROID IF ABNORMAL: 1.62 UIU/ML (ref 0.36–3.74)
UROBILINOGEN UR QL STRIP.AUTO: 1 EU/DL (ref 0.2–1)
VLDLC SERPL CALC-MCNC: 17 MG/DL (ref 6–23)
WBC # BLD AUTO: 5.8 K/UL (ref 4.3–11.1)

## 2023-11-15 PROCEDURE — G8417 CALC BMI ABV UP PARAM F/U: HCPCS | Performed by: INTERNAL MEDICINE

## 2023-11-15 PROCEDURE — 99214 OFFICE O/P EST MOD 30 MIN: CPT | Performed by: INTERNAL MEDICINE

## 2023-11-15 PROCEDURE — G8484 FLU IMMUNIZE NO ADMIN: HCPCS | Performed by: INTERNAL MEDICINE

## 2023-11-15 PROCEDURE — 1123F ACP DISCUSS/DSCN MKR DOCD: CPT | Performed by: INTERNAL MEDICINE

## 2023-11-15 PROCEDURE — 3023F SPIROM DOC REV: CPT | Performed by: INTERNAL MEDICINE

## 2023-11-15 PROCEDURE — 4004F PT TOBACCO SCREEN RCVD TLK: CPT | Performed by: INTERNAL MEDICINE

## 2023-11-15 PROCEDURE — G8427 DOCREV CUR MEDS BY ELIG CLIN: HCPCS | Performed by: INTERNAL MEDICINE

## 2023-11-15 RX ORDER — LANOLIN ALCOHOL/MO/W.PET/CERES
3 CREAM (GRAM) TOPICAL DAILY
Refills: 3 | COMMUNITY
Start: 2023-11-15

## 2023-11-15 RX ORDER — ESCITALOPRAM OXALATE 20 MG/1
20 TABLET ORAL DAILY
Qty: 30 TABLET | Refills: 11 | Status: SHIPPED | OUTPATIENT
Start: 2023-11-15 | End: 2023-11-15

## 2023-11-15 RX ORDER — FLUTICASONE PROPIONATE 44 UG/1
2 AEROSOL, METERED RESPIRATORY (INHALATION) 2 TIMES DAILY
Qty: 1 EACH | Refills: 11 | Status: SHIPPED | OUTPATIENT
Start: 2023-11-15

## 2023-11-15 RX ORDER — ESCITALOPRAM OXALATE 5 MG/1
5 TABLET ORAL DAILY
Qty: 30 TABLET | Refills: 11 | Status: SHIPPED | OUTPATIENT
Start: 2023-11-15

## 2023-11-15 ASSESSMENT — ENCOUNTER SYMPTOMS
NAUSEA: 0
SHORTNESS OF BREATH: 0
WHEEZING: 0
BACK PAIN: 1
COUGH: 1
DIARRHEA: 0
CONSTIPATION: 0
VOMITING: 0

## 2023-11-17 NOTE — RESULT ENCOUNTER NOTE
Labs are stable. Continue your current doses of medications. Keep up the good work. Thanks.   [facility]

## 2023-11-20 ENCOUNTER — TELEPHONE (OUTPATIENT)
Dept: INTERNAL MEDICINE CLINIC | Facility: CLINIC | Age: 88
End: 2023-11-20

## 2023-11-20 NOTE — TELEPHONE ENCOUNTER
Patient said he was put on a 5mg for lexapro but when he went to the pharmacy he was given 20mg and 5mg.

## 2023-11-20 NOTE — TELEPHONE ENCOUNTER
He is to be on the Lexapro 5 mg - the 20 mg was discontinued in his chart    Left message for pt to return call.

## 2023-12-08 ENCOUNTER — OFFICE VISIT (OUTPATIENT)
Age: 88
End: 2023-12-08
Payer: MEDICARE

## 2023-12-08 VITALS
BODY MASS INDEX: 24.75 KG/M2 | DIASTOLIC BLOOD PRESSURE: 79 MMHG | HEIGHT: 64 IN | SYSTOLIC BLOOD PRESSURE: 139 MMHG | WEIGHT: 145 LBS | HEART RATE: 67 BPM

## 2023-12-08 DIAGNOSIS — R07.9 CHEST PAIN, UNSPECIFIED TYPE: ICD-10-CM

## 2023-12-08 DIAGNOSIS — I25.119 CORONARY ARTERY DISEASE INVOLVING NATIVE CORONARY ARTERY OF NATIVE HEART WITH ANGINA PECTORIS (HCC): ICD-10-CM

## 2023-12-08 DIAGNOSIS — Z95.0 PACEMAKER: ICD-10-CM

## 2023-12-08 DIAGNOSIS — I48.0 PAROXYSMAL ATRIAL FIBRILLATION (HCC): Primary | ICD-10-CM

## 2023-12-08 PROCEDURE — G8420 CALC BMI NORM PARAMETERS: HCPCS | Performed by: INTERNAL MEDICINE

## 2023-12-08 PROCEDURE — 1123F ACP DISCUSS/DSCN MKR DOCD: CPT | Performed by: INTERNAL MEDICINE

## 2023-12-08 PROCEDURE — 99214 OFFICE O/P EST MOD 30 MIN: CPT | Performed by: INTERNAL MEDICINE

## 2023-12-08 PROCEDURE — G8427 DOCREV CUR MEDS BY ELIG CLIN: HCPCS | Performed by: INTERNAL MEDICINE

## 2023-12-08 PROCEDURE — 93000 ELECTROCARDIOGRAM COMPLETE: CPT | Performed by: INTERNAL MEDICINE

## 2023-12-08 PROCEDURE — G8484 FLU IMMUNIZE NO ADMIN: HCPCS | Performed by: INTERNAL MEDICINE

## 2023-12-08 PROCEDURE — 4004F PT TOBACCO SCREEN RCVD TLK: CPT | Performed by: INTERNAL MEDICINE

## 2023-12-08 RX ORDER — NAPROXEN SODIUM 220 MG
220 TABLET ORAL 2 TIMES DAILY WITH MEALS
COMMUNITY

## 2023-12-08 RX ORDER — ASPIRIN 81 MG/1
81 TABLET ORAL DAILY
COMMUNITY

## 2023-12-08 RX ORDER — NITROGLYCERIN 0.4 MG/1
0.4 TABLET SUBLINGUAL EVERY 5 MIN PRN
Qty: 25 TABLET | Refills: 4 | Status: SHIPPED | OUTPATIENT
Start: 2023-12-08

## 2023-12-08 ASSESSMENT — ENCOUNTER SYMPTOMS
ORTHOPNEA: 0
COUGH: 0
SPUTUM PRODUCTION: 0
HEMOPTYSIS: 0
SHORTNESS OF BREATH: 0
WHEEZING: 0
HOARSE VOICE: 0
HEMATEMESIS: 0
BACK PAIN: 0
DIARRHEA: 0
COLOR CHANGE: 0
BLURRED VISION: 0
ABDOMINAL PAIN: 0
NAUSEA: 0
VOMITING: 0
HEMATOCHEZIA: 0
BLOATING: 1
BOWEL INCONTINENCE: 0

## 2023-12-08 NOTE — PROGRESS NOTES
[facility] CARDIOLOGY  [address] [address]  PHONE: [phone]        23        NAME:  Micah Hazel  :   MRN: [id number]       SUBJECTIVE:   Micah Hazel is a 80 y.o. male seen for a follow up visit regarding the following: CAD,PAF,and SSS with PPM. In the past,he has reported intermittent chest pain. He returns scheduled follow up. He complains of ?worsening chest pain that may be due ? \"Gas\". Chief Complaint   Patient presents with    Coronary Artery Disease       HPI:    Chest Pain   This is a chronic problem. The onset quality is gradual. The problem has been gradually worsening. The pain is present in the substernal region. The pain is at a severity of 1/10. The pain is mild. The quality of the pain is described as tightness. The pain does not radiate. Pertinent negatives include no abdominal pain, back pain, claudication, cough, diaphoresis, dizziness, exertional chest pressure, fever, headaches, hemoptysis, irregular heartbeat, leg pain, lower extremity edema, malaise/fatigue, nausea, near-syncope, numbness, orthopnea, palpitations, PND, shortness of breath, sputum production, syncope, vomiting or weakness. The pain is aggravated by nothing. He has tried nothing for the symptoms. Past Medical History, Past Surgical History, Family history, Social History, and Medications were all reviewed with the patient today and updated as necessary.          Current Outpatient Medications:     naproxen sodium (ANAPROX) 220 MG tablet, Take 1 tablet by mouth 2 times daily (with meals), Disp: , Rfl:     aspirin 81 MG EC tablet, Take 1 tablet by mouth daily, Disp: , Rfl:     nitroGLYCERIN (NITROSTAT) 0.4 MG SL tablet, Place 1 tablet under the tongue every 5 minutes as needed for Chest pain, Disp: 25 tablet, Rfl: 4    fluticasone (FLOVENT HFA) 44 MCG/ACT inhaler, Inhale 2 puffs into the lungs 2 times daily, Disp: 1 each, Rfl: 11    albuterol-ipratropium

## 2024-01-25 ENCOUNTER — NURSE ONLY (OUTPATIENT)
Age: 89
End: 2024-01-25

## 2024-01-25 DIAGNOSIS — I44.2 ATRIOVENTRICULAR BLOCK, COMPLETE (HCC): Primary | ICD-10-CM

## 2024-01-29 ENCOUNTER — OFFICE VISIT (OUTPATIENT)
Age: 89
End: 2024-01-29

## 2024-01-29 VITALS
SYSTOLIC BLOOD PRESSURE: 112 MMHG | BODY MASS INDEX: 25.06 KG/M2 | DIASTOLIC BLOOD PRESSURE: 66 MMHG | HEART RATE: 80 BPM | HEIGHT: 64 IN | WEIGHT: 146.8 LBS

## 2024-01-29 DIAGNOSIS — Z95.0 PACEMAKER: ICD-10-CM

## 2024-01-29 DIAGNOSIS — I48.0 PAROXYSMAL ATRIAL FIBRILLATION (HCC): Primary | ICD-10-CM

## 2024-01-29 DIAGNOSIS — I25.119 CORONARY ARTERY DISEASE INVOLVING NATIVE CORONARY ARTERY OF NATIVE HEART WITH ANGINA PECTORIS (HCC): ICD-10-CM

## 2024-01-29 ASSESSMENT — ENCOUNTER SYMPTOMS
SPUTUM PRODUCTION: 0
COLOR CHANGE: 0
WHEEZING: 0
HEMATOCHEZIA: 0
CHEST TIGHTNESS: 0
DIARRHEA: 0
SHORTNESS OF BREATH: 0
HOARSE VOICE: 0
HEMATEMESIS: 0
ABDOMINAL PAIN: 0
BOWEL INCONTINENCE: 0
ORTHOPNEA: 0
BLURRED VISION: 0

## 2024-01-29 NOTE — PROGRESS NOTES
by mouth 2 times daily for 180 days., Disp: 60 tablet, Rfl: 5    metoprolol succinate (TOPROL XL) 25 MG extended release tablet, Take 1 tablet by mouth daily, Disp: 90 tablet, Rfl: 3    traZODone (DESYREL) 50 MG tablet, TAKE 1 TABLET BY MOUTH NIGHTLY AS NEEDED FOR SLEEP, Disp: 60 tablet, Rfl: PRN    levothyroxine (SYNTHROID) 50 MCG tablet, TAKE 1 TABLET BY MOUTH EVERY DAY BEFORE BREAKFAST, Disp: 90 tablet, Rfl: 3    Garlic 1000 MG CAPS, Take by mouth daily, Disp: , Rfl:     Multiple Vitamins-Minerals (CENTRUM SILVER 50+MEN PO), Take by mouth daily, Disp: , Rfl:     docusate sodium (COLACE) 250 MG capsule, Take 1 capsule by mouth daily, Disp: , Rfl:     acetaminophen (TYLENOL) 500 MG tablet, Take 2 tablets by mouth every 6 hours as needed, Disp: , Rfl:     albuterol sulfate  (90 Base) MCG/ACT inhaler, Inhale 2 puffs into the lungs every 4 hours as needed, Disp: , Rfl:     fluticasone (FLONASE) 50 MCG/ACT nasal spray, 2 sprays by Nasal route daily as needed, Disp: , Rfl:     ibuprofen (ADVIL;MOTRIN) 200 MG tablet, Take 2 tablets by mouth every 8 hours as needed, Disp: , Rfl:     meclizine (ANTIVERT) 25 MG tablet, Take 1 tablet by mouth 3 times daily as needed, Disp: , Rfl:     potassium gluconate 550 mg tablet, Take 99 mg by mouth daily, Disp: , Rfl:   No Known Allergies  Past Medical History:   Diagnosis Date    Abdominal pain, right upper quadrant 7/14/2015    Actinic keratosis 7/14/2015    Acute bronchitis with COPD (HCC)     Allergic rhinitis, cause unspecified 7/14/2015    Anal or rectal pain 7/14/2015    Anxiety state, unspecified 7/14/2015    Asthma     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) 7/14/2015    S/p pacer    Atrioventricular block, complete ([facility]) 7/14/2015    S/p pacer     Backache, unspecified 7/14/2015    Bladder neck obstruction 7/14/2015    Brachial neuritis or radiculitis NOS 7/14/2015    Bronchiectasis with acute exacerbation (HCC) 7/14/2015

## 2024-02-15 ENCOUNTER — OFFICE VISIT (OUTPATIENT)
Dept: INTERNAL MEDICINE CLINIC | Facility: CLINIC | Age: 89
End: 2024-02-15
Payer: MEDICARE

## 2024-02-15 VITALS
BODY MASS INDEX: 25.64 KG/M2 | HEART RATE: 74 BPM | DIASTOLIC BLOOD PRESSURE: 51 MMHG | TEMPERATURE: 97.3 F | SYSTOLIC BLOOD PRESSURE: 105 MMHG | HEIGHT: 64 IN | WEIGHT: 150.2 LBS | RESPIRATION RATE: 16 BRPM | OXYGEN SATURATION: 96 %

## 2024-02-15 DIAGNOSIS — R26.81 GAIT INSTABILITY: ICD-10-CM

## 2024-02-15 DIAGNOSIS — G89.29 CHRONIC LOW BACK PAIN, UNSPECIFIED BACK PAIN LATERALITY, UNSPECIFIED WHETHER SCIATICA PRESENT: ICD-10-CM

## 2024-02-15 DIAGNOSIS — M79.662 BILATERAL CALF PAIN: Primary | ICD-10-CM

## 2024-02-15 DIAGNOSIS — M54.50 CHRONIC LOW BACK PAIN, UNSPECIFIED BACK PAIN LATERALITY, UNSPECIFIED WHETHER SCIATICA PRESENT: ICD-10-CM

## 2024-02-15 DIAGNOSIS — Z85.46 PERSONAL HISTORY OF MALIGNANT NEOPLASM OF PROSTATE: ICD-10-CM

## 2024-02-15 DIAGNOSIS — R32 URINARY INCONTINENCE, UNSPECIFIED TYPE: ICD-10-CM

## 2024-02-15 DIAGNOSIS — M79.661 BILATERAL CALF PAIN: Primary | ICD-10-CM

## 2024-02-15 LAB
ANION GAP SERPL CALC-SCNC: 1 MMOL/L (ref 2–11)
BASOPHILS # BLD: 0.1 K/UL (ref 0–0.2)
BASOPHILS NFR BLD: 1 % (ref 0–2)
BUN SERPL-MCNC: 12 MG/DL (ref 8–23)
CALCIUM SERPL-MCNC: 8.5 MG/DL (ref 8.3–10.4)
CHLORIDE SERPL-SCNC: 112 MMOL/L (ref 103–113)
CO2 SERPL-SCNC: 29 MMOL/L (ref 21–32)
CREAT SERPL-MCNC: 1 MG/DL (ref 0.8–1.5)
D DIMER PPP FEU-MCNC: 0.61 UG/ML(FEU)
DIFFERENTIAL METHOD BLD: NORMAL
EOSINOPHIL # BLD: 0.4 K/UL (ref 0–0.8)
EOSINOPHIL NFR BLD: 7 % (ref 0.5–7.8)
ERYTHROCYTE [DISTWIDTH] IN BLOOD BY AUTOMATED COUNT: 14.2 % (ref 11.9–14.6)
GLUCOSE SERPL-MCNC: 111 MG/DL (ref 65–100)
HCT VFR BLD AUTO: 44.1 % (ref 41.1–50.3)
HGB BLD-MCNC: 13.9 G/DL (ref 13.6–17.2)
IMM GRANULOCYTES # BLD AUTO: 0 K/UL (ref 0–0.5)
IMM GRANULOCYTES NFR BLD AUTO: 0 % (ref 0–5)
LYMPHOCYTES # BLD: 1.9 K/UL (ref 0.5–4.6)
LYMPHOCYTES NFR BLD: 29 % (ref 13–44)
MAGNESIUM SERPL-MCNC: 2.2 MG/DL (ref 1.8–2.4)
MCH RBC QN AUTO: 31 PG (ref 26.1–32.9)
MCHC RBC AUTO-ENTMCNC: 31.5 G/DL (ref 31.4–35)
MCV RBC AUTO: 98.2 FL (ref 82–102)
MONOCYTES # BLD: 0.5 K/UL (ref 0.1–1.3)
MONOCYTES NFR BLD: 8 % (ref 4–12)
NEUTS SEG # BLD: 3.6 K/UL (ref 1.7–8.2)
NEUTS SEG NFR BLD: 55 % (ref 43–78)
NRBC # BLD: 0 K/UL (ref 0–0.2)
PLATELET # BLD AUTO: 182 K/UL (ref 150–450)
PMV BLD AUTO: 9.9 FL (ref 9.4–12.3)
POTASSIUM SERPL-SCNC: 3.9 MMOL/L (ref 3.5–5.1)
RBC # BLD AUTO: 4.49 M/UL (ref 4.23–5.6)
SODIUM SERPL-SCNC: 142 MMOL/L (ref 136–146)
WBC # BLD AUTO: 6.5 K/UL (ref 4.3–11.1)

## 2024-02-15 PROCEDURE — 1123F ACP DISCUSS/DSCN MKR DOCD: CPT | Performed by: NURSE PRACTITIONER

## 2024-02-15 PROCEDURE — G8427 DOCREV CUR MEDS BY ELIG CLIN: HCPCS | Performed by: NURSE PRACTITIONER

## 2024-02-15 PROCEDURE — G8417 CALC BMI ABV UP PARAM F/U: HCPCS | Performed by: NURSE PRACTITIONER

## 2024-02-15 PROCEDURE — G8484 FLU IMMUNIZE NO ADMIN: HCPCS | Performed by: NURSE PRACTITIONER

## 2024-02-15 PROCEDURE — 4004F PT TOBACCO SCREEN RCVD TLK: CPT | Performed by: NURSE PRACTITIONER

## 2024-02-15 PROCEDURE — 99214 OFFICE O/P EST MOD 30 MIN: CPT | Performed by: NURSE PRACTITIONER

## 2024-02-15 RX ORDER — LORAZEPAM 1 MG/1
1 TABLET ORAL 2 TIMES DAILY PRN
COMMUNITY

## 2024-02-16 ENCOUNTER — TELEPHONE (OUTPATIENT)
Dept: INTERNAL MEDICINE CLINIC | Facility: CLINIC | Age: 89
End: 2024-02-16

## 2024-02-16 NOTE — TELEPHONE ENCOUNTER
Mr. Hirsch-  The labs that we checked yesterday are all stable. Would love for you to do physical therapy to help strengthen your legs. Let us know if you decide to do this.   Please let us know if you develop  any new or worsening sx.  Narcisa

## 2024-02-16 NOTE — TELEPHONE ENCOUNTER
Pt reports legs feel better this morning and he is on track with his medications. Notified of lab results.     Pt does not have reliable transportation to PT and would like to work on strengthening legs at home.

## 2024-02-23 ENCOUNTER — TELEPHONE (OUTPATIENT)
Dept: INTERNAL MEDICINE CLINIC | Facility: CLINIC | Age: 89
End: 2024-02-23

## 2024-02-23 ENCOUNTER — TELEMEDICINE (OUTPATIENT)
Dept: INTERNAL MEDICINE CLINIC | Facility: CLINIC | Age: 89
End: 2024-02-23
Payer: MEDICARE

## 2024-02-23 DIAGNOSIS — G89.29 CHRONIC LOW BACK PAIN, UNSPECIFIED BACK PAIN LATERALITY, UNSPECIFIED WHETHER SCIATICA PRESENT: ICD-10-CM

## 2024-02-23 DIAGNOSIS — M79.662 BILATERAL CALF PAIN: Primary | ICD-10-CM

## 2024-02-23 DIAGNOSIS — M54.50 CHRONIC LOW BACK PAIN, UNSPECIFIED BACK PAIN LATERALITY, UNSPECIFIED WHETHER SCIATICA PRESENT: ICD-10-CM

## 2024-02-23 DIAGNOSIS — M79.661 BILATERAL CALF PAIN: Primary | ICD-10-CM

## 2024-02-23 DIAGNOSIS — R26.81 GAIT INSTABILITY: ICD-10-CM

## 2024-02-23 DIAGNOSIS — M79.604 PAIN IN BOTH LOWER EXTREMITIES: ICD-10-CM

## 2024-02-23 DIAGNOSIS — M79.605 PAIN IN BOTH LOWER EXTREMITIES: ICD-10-CM

## 2024-02-23 PROCEDURE — 99443 PR PHYS/QHP TELEPHONE EVALUATION 21-30 MIN: CPT | Performed by: NURSE PRACTITIONER

## 2024-02-23 RX ORDER — GABAPENTIN 100 MG/1
100 CAPSULE ORAL DAILY PRN
Qty: 180 CAPSULE | Refills: 1 | Status: SHIPPED | OUTPATIENT
Start: 2024-02-23 | End: 2024-08-21

## 2024-02-23 ASSESSMENT — PATIENT HEALTH QUESTIONNAIRE - PHQ9
2. FEELING DOWN, DEPRESSED OR HOPELESS: 0
SUM OF ALL RESPONSES TO PHQ9 QUESTIONS 1 & 2: 0
SUM OF ALL RESPONSES TO PHQ QUESTIONS 1-9: 2
SUM OF ALL RESPONSES TO PHQ QUESTIONS 1-9: 2
10. IF YOU CHECKED OFF ANY PROBLEMS, HOW DIFFICULT HAVE THESE PROBLEMS MADE IT FOR YOU TO DO YOUR WORK, TAKE CARE OF THINGS AT HOME, OR GET ALONG WITH OTHER PEOPLE: 1
3. TROUBLE FALLING OR STAYING ASLEEP: 1
5. POOR APPETITE OR OVEREATING: 0
SUM OF ALL RESPONSES TO PHQ QUESTIONS 1-9: 2
8. MOVING OR SPEAKING SO SLOWLY THAT OTHER PEOPLE COULD HAVE NOTICED. OR THE OPPOSITE, BEING SO FIGETY OR RESTLESS THAT YOU HAVE BEEN MOVING AROUND A LOT MORE THAN USUAL: 0
SUM OF ALL RESPONSES TO PHQ QUESTIONS 1-9: 2
7. TROUBLE CONCENTRATING ON THINGS, SUCH AS READING THE NEWSPAPER OR WATCHING TELEVISION: 1
1. LITTLE INTEREST OR PLEASURE IN DOING THINGS: 0
4. FEELING TIRED OR HAVING LITTLE ENERGY: 0
9. THOUGHTS THAT YOU WOULD BE BETTER OFF DEAD, OR OF HURTING YOURSELF: 0
6. FEELING BAD ABOUT YOURSELF - OR THAT YOU ARE A FAILURE OR HAVE LET YOURSELF OR YOUR FAMILY DOWN: 0

## 2024-02-23 ASSESSMENT — ENCOUNTER SYMPTOMS
COUGH: 0
SHORTNESS OF BREATH: 0
ABDOMINAL PAIN: 0

## 2024-02-23 NOTE — PROGRESS NOTES
2/23/2024 10:55 AM  Location:[facility] PHYSICIAN SERVICES  [facility] [place]INE  [place]  Patient #:  [id number]  YOB: 1934        History of Present Illness     Chief Complaint   Patient presents with    Other     Pain in legs x3 years knees getting worse       Mr. Hirsch is a 89 y.o. male  who presents for vv on phone.   He is has been having bilateral knee pain and calf pain for months now, feels very weak, feels like he is walking drunk and very unsteady , calves burn, has to use a cane or walker to walk.    Denies color change  of extremities.  He has been taking 3 ibuprofen per day. This helps.  He has also been taking pain medication but cannot remember what the name of the medication was but states it did not help.  He has naproxen on his medication list but denies taking this. Poor historian.   Recently seen 2/15 for similar and rec'd for PT. Bmp, mag, cbc wnl checked at 2/15 ov.     Unknown hx of neuropathy. He does have chronic urinary incontinence, hx of prostate ca, has declined for psa to be checked.     Has had normal joana within past year.     He has fallen recently at home, trips easily over things but has not hit his head or had loc and states he is very unsteady on his feet/feels drunk at times/trouble being able to tell where his feet are placed.  Does not have HH/PT.  He has not had lumbar xray , but has hx of cervical radiculopathy.   Also has hx of lumbar back pain/last ov reported this in addition to his leg pain.   He lives alone, no transport until his grandson comes back and forth from [place].       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

## 2024-02-24 ENCOUNTER — HOME HEALTH ADMISSION (OUTPATIENT)
Dept: HOME HEALTH SERVICES | Facility: HOME HEALTH | Age: 89
End: 2024-02-24

## 2024-03-11 DIAGNOSIS — F41.9 ANXIETY: Primary | ICD-10-CM

## 2024-03-11 RX ORDER — LORAZEPAM 1 MG/1
1 TABLET ORAL 2 TIMES DAILY
Qty: 60 TABLET | Refills: 5 | Status: SHIPPED | OUTPATIENT
Start: 2024-03-11 | End: 2024-09-07

## 2024-03-14 ENCOUNTER — TELEPHONE (OUTPATIENT)
Dept: INTERNAL MEDICINE CLINIC | Facility: CLINIC | Age: 89
End: 2024-03-14

## 2024-03-14 RX ORDER — BUDESONIDE AND FORMOTEROL FUMARATE DIHYDRATE 160; 4.5 UG/1; UG/1
2 AEROSOL RESPIRATORY (INHALATION) 2 TIMES DAILY
Qty: 10.2 G | Refills: 3 | Status: SHIPPED | OUTPATIENT
Start: 2024-03-14

## 2024-03-14 NOTE — TELEPHONE ENCOUNTER
In order for Symbicort to approved for the pt some clinical questions need to be answered. Please call [phone] and give ref# [id number]

## 2024-03-18 NOTE — TELEPHONE ENCOUNTER
Medication approved   Case ID: [id number] Appeal supported: No  Note from payer: [place] Case: [id number], Status: Approved, Coverage Starts on: 3/16/2024 4:42:45 PM, Coverage Ends on: 3/16/2024

## 2024-03-21 ENCOUNTER — TELEPHONE (OUTPATIENT)
Dept: INTERNAL MEDICINE CLINIC | Facility: CLINIC | Age: 89
End: 2024-03-21

## 2024-03-21 NOTE — TELEPHONE ENCOUNTER
Called patient and left voicemail that we rescheduled his appointment on 03/29/2024 to 03/28/2024 at 820am

## 2024-03-25 RX ORDER — LEVOTHYROXINE SODIUM 0.05 MG/1
TABLET ORAL
Qty: 90 TABLET | Refills: 3 | Status: SHIPPED | OUTPATIENT
Start: 2024-03-25

## 2024-03-28 ENCOUNTER — OFFICE VISIT (OUTPATIENT)
Dept: INTERNAL MEDICINE CLINIC | Facility: CLINIC | Age: 89
End: 2024-03-28
Payer: MEDICARE

## 2024-03-28 VITALS
DIASTOLIC BLOOD PRESSURE: 56 MMHG | OXYGEN SATURATION: 96 % | HEIGHT: 64 IN | WEIGHT: 147 LBS | RESPIRATION RATE: 18 BRPM | SYSTOLIC BLOOD PRESSURE: 107 MMHG | HEART RATE: 66 BPM | TEMPERATURE: 97.4 F | BODY MASS INDEX: 25.1 KG/M2

## 2024-03-28 DIAGNOSIS — G89.29 CHRONIC LOW BACK PAIN, UNSPECIFIED BACK PAIN LATERALITY, UNSPECIFIED WHETHER SCIATICA PRESENT: ICD-10-CM

## 2024-03-28 DIAGNOSIS — M54.50 CHRONIC LOW BACK PAIN, UNSPECIFIED BACK PAIN LATERALITY, UNSPECIFIED WHETHER SCIATICA PRESENT: ICD-10-CM

## 2024-03-28 DIAGNOSIS — R32 URINARY INCONTINENCE, UNSPECIFIED TYPE: ICD-10-CM

## 2024-03-28 DIAGNOSIS — J30.1 SEASONAL ALLERGIC RHINITIS DUE TO POLLEN: ICD-10-CM

## 2024-03-28 DIAGNOSIS — D48.9 NEOPLASM OF UNCERTAIN BEHAVIOR: ICD-10-CM

## 2024-03-28 DIAGNOSIS — R26.81 GAIT INSTABILITY: ICD-10-CM

## 2024-03-28 DIAGNOSIS — R42 DIZZINESS: ICD-10-CM

## 2024-03-28 DIAGNOSIS — N32.81 OAB (OVERACTIVE BLADDER): Primary | ICD-10-CM

## 2024-03-28 PROCEDURE — 1123F ACP DISCUSS/DSCN MKR DOCD: CPT | Performed by: NURSE PRACTITIONER

## 2024-03-28 PROCEDURE — 4004F PT TOBACCO SCREEN RCVD TLK: CPT | Performed by: NURSE PRACTITIONER

## 2024-03-28 PROCEDURE — G8417 CALC BMI ABV UP PARAM F/U: HCPCS | Performed by: NURSE PRACTITIONER

## 2024-03-28 PROCEDURE — 99214 OFFICE O/P EST MOD 30 MIN: CPT | Performed by: NURSE PRACTITIONER

## 2024-03-28 PROCEDURE — G8484 FLU IMMUNIZE NO ADMIN: HCPCS | Performed by: NURSE PRACTITIONER

## 2024-03-28 PROCEDURE — G8427 DOCREV CUR MEDS BY ELIG CLIN: HCPCS | Performed by: NURSE PRACTITIONER

## 2024-03-28 RX ORDER — FLUTICASONE PROPIONATE 50 MCG
2 SPRAY, SUSPENSION (ML) NASAL DAILY PRN
Qty: 16 G | Refills: 5 | Status: SHIPPED | OUTPATIENT
Start: 2024-03-28

## 2024-03-28 ASSESSMENT — ENCOUNTER SYMPTOMS
NAUSEA: 0
VOICE CHANGE: 1
VOMITING: 0

## 2024-03-28 NOTE — PROGRESS NOTES
3/28/2024 8:11 AM  Location:[facility] PHYSICIAN SERVICES  [facility] [place]  [place]  Patient #:  [id number]  YOB: 1934          YOUR LAST HEMOGLOBIN A1CS:   No results found for: \"HBA1C\", \"[id number]\"    YOUR LAST LIPID PROFILE:   Lab Results   Component Value Date/Time    CHOL 143 11/15/2023 11:22 AM    HDL 35 11/15/2023 11:22 AM    VLDL 22 10/19/2021 10:25 AM         Lab Results   Component Value Date/Time    GFRAA >60 08/03/2022 03:42 PM    BUN 12 02/15/2024 11:02 AM     02/15/2024 11:02 AM    K 3.9 02/15/2024 11:02 AM     02/15/2024 11:02 AM    CO2 29 02/15/2024 11:02 AM           History of Present Illness     Chief Complaint   Patient presents with    Follow-up     Patient presents to office for a 1 month follow-up    Skin Problem     Patient states that he has a ingrown hair on his nose. He has been using tweezers to get them out        Mr. Hirsch is a 89 y.o. male  who presents for the above mentioned complaints.  Mr. Hirsch presents for follow-up.  He was seen last month for chronic leg pain, low-dose gabapentin was started and he reports resolution of symptoms.  He never had his x-ray.    He continues to live alone, reports that he feels off balance and unsteady in the mornings when he gets up.    He uses a cane for ambulation.  He reports that he does have other assistive devices in his home.  He continues to drive, has family nearby.  Did not hear from physical therapy but would be interested in home health for strengthening.    Notes that he has troubles with overactive bladder and chronic incontinence for which she has to wear a diaper.  Would like to trial Gemtesa.  Is not concern for cost.    Reports that he had a bump to the tip of his nose a week ago and has been putting a salve on it, it has improved some but is now scaling and discolored.  He does have a history of basal cell carcinoma.         No Known Allergies  Past Medical History:   Diagnosis Date

## 2024-04-25 ENCOUNTER — TELEPHONE (OUTPATIENT)
Dept: INTERNAL MEDICINE CLINIC | Facility: CLINIC | Age: 89
End: 2024-04-25

## 2024-04-25 NOTE — TELEPHONE ENCOUNTER
Patient called asked that the gemtesa 75 mg be raised to 100 mg and would like it refilled to [facility] in [place].   Patient stated that the medication helped but not enough. Patient would like a call back from provider.

## 2024-05-03 PROCEDURE — 93296 REM INTERROG EVL PM/IDS: CPT | Performed by: INTERNAL MEDICINE

## 2024-05-03 PROCEDURE — 93294 REM INTERROG EVL PM/LDLS PM: CPT | Performed by: INTERNAL MEDICINE

## 2024-05-15 ENCOUNTER — TELEPHONE (OUTPATIENT)
Dept: INTERNAL MEDICINE CLINIC | Facility: CLINIC | Age: 89
End: 2024-05-15

## 2024-05-15 ENCOUNTER — OFFICE VISIT (OUTPATIENT)
Dept: INTERNAL MEDICINE CLINIC | Facility: CLINIC | Age: 89
End: 2024-05-15
Payer: MEDICARE

## 2024-05-15 VITALS
SYSTOLIC BLOOD PRESSURE: 145 MMHG | DIASTOLIC BLOOD PRESSURE: 74 MMHG | OXYGEN SATURATION: 96 % | RESPIRATION RATE: 16 BRPM | HEART RATE: 60 BPM | HEIGHT: 64 IN | BODY MASS INDEX: 26.12 KG/M2 | WEIGHT: 153 LBS

## 2024-05-15 DIAGNOSIS — M54.50 CHRONIC LOW BACK PAIN, UNSPECIFIED BACK PAIN LATERALITY, UNSPECIFIED WHETHER SCIATICA PRESENT: ICD-10-CM

## 2024-05-15 DIAGNOSIS — R09.89 RALES: ICD-10-CM

## 2024-05-15 DIAGNOSIS — J44.9 CHRONIC OBSTRUCTIVE PULMONARY DISEASE, UNSPECIFIED COPD TYPE (HCC): ICD-10-CM

## 2024-05-15 DIAGNOSIS — Z85.46 PERSONAL HISTORY OF MALIGNANT NEOPLASM OF PROSTATE: ICD-10-CM

## 2024-05-15 DIAGNOSIS — R32 URINARY INCONTINENCE, UNSPECIFIED TYPE: ICD-10-CM

## 2024-05-15 DIAGNOSIS — E78.5 HYPERLIPIDEMIA, UNSPECIFIED HYPERLIPIDEMIA TYPE: ICD-10-CM

## 2024-05-15 DIAGNOSIS — I48.0 PAROXYSMAL ATRIAL FIBRILLATION (HCC): ICD-10-CM

## 2024-05-15 DIAGNOSIS — N32.81 OAB (OVERACTIVE BLADDER): Primary | ICD-10-CM

## 2024-05-15 DIAGNOSIS — Z91.81 AT HIGH RISK FOR FALLS: ICD-10-CM

## 2024-05-15 DIAGNOSIS — R26.81 GAIT INSTABILITY: ICD-10-CM

## 2024-05-15 DIAGNOSIS — F41.9 ANXIETY: ICD-10-CM

## 2024-05-15 DIAGNOSIS — I25.119 CORONARY ARTERY DISEASE INVOLVING NATIVE CORONARY ARTERY OF NATIVE HEART WITH ANGINA PECTORIS (HCC): ICD-10-CM

## 2024-05-15 DIAGNOSIS — G89.29 CHRONIC LOW BACK PAIN, UNSPECIFIED BACK PAIN LATERALITY, UNSPECIFIED WHETHER SCIATICA PRESENT: ICD-10-CM

## 2024-05-15 LAB — ERYTHROCYTE [SEDIMENTATION RATE] IN BLOOD: 1 MM/HR

## 2024-05-15 PROCEDURE — 99214 OFFICE O/P EST MOD 30 MIN: CPT | Performed by: INTERNAL MEDICINE

## 2024-05-15 PROCEDURE — 1123F ACP DISCUSS/DSCN MKR DOCD: CPT | Performed by: INTERNAL MEDICINE

## 2024-05-15 PROCEDURE — G8427 DOCREV CUR MEDS BY ELIG CLIN: HCPCS | Performed by: INTERNAL MEDICINE

## 2024-05-15 PROCEDURE — G8417 CALC BMI ABV UP PARAM F/U: HCPCS | Performed by: INTERNAL MEDICINE

## 2024-05-15 PROCEDURE — 1036F TOBACCO NON-USER: CPT | Performed by: INTERNAL MEDICINE

## 2024-05-15 PROCEDURE — 3023F SPIROM DOC REV: CPT | Performed by: INTERNAL MEDICINE

## 2024-05-15 RX ORDER — TOLTERODINE 4 MG/1
4 CAPSULE, EXTENDED RELEASE ORAL DAILY
Qty: 90 CAPSULE | Refills: 1 | Status: SHIPPED | OUTPATIENT
Start: 2024-05-15

## 2024-05-15 RX ORDER — BUDESONIDE AND FORMOTEROL FUMARATE DIHYDRATE 160; 4.5 UG/1; UG/1
2 AEROSOL RESPIRATORY (INHALATION) 2 TIMES DAILY
Qty: 10.2 G | Refills: 3 | Status: SHIPPED | OUTPATIENT
Start: 2024-05-15

## 2024-05-15 SDOH — ECONOMIC STABILITY: FOOD INSECURITY: WITHIN THE PAST 12 MONTHS, YOU WORRIED THAT YOUR FOOD WOULD RUN OUT BEFORE YOU GOT MONEY TO BUY MORE.: NEVER TRUE

## 2024-05-15 SDOH — ECONOMIC STABILITY: FOOD INSECURITY: WITHIN THE PAST 12 MONTHS, THE FOOD YOU BOUGHT JUST DIDN'T LAST AND YOU DIDN'T HAVE MONEY TO GET MORE.: NEVER TRUE

## 2024-05-15 SDOH — ECONOMIC STABILITY: INCOME INSECURITY: HOW HARD IS IT FOR YOU TO PAY FOR THE VERY BASICS LIKE FOOD, HOUSING, MEDICAL CARE, AND HEATING?: SOMEWHAT HARD

## 2024-05-15 ASSESSMENT — ENCOUNTER SYMPTOMS
WHEEZING: 1
COUGH: 1
BACK PAIN: 1

## 2024-05-15 NOTE — TELEPHONE ENCOUNTER
No evidence of pneumonia on your CXR.  Likely what I heard if scarring.  No cause for concern at this point.

## 2024-05-15 NOTE — PROGRESS NOTES
5/15/2024 10:53 AM  Location:[facility] PHYSICIAN SERVICES  [facility] [place]INE  [place]  Patient #:  [id number]  YOB: 1934            History of Present Illness     Chief Complaint   Patient presents with    6 Month Follow-Up    Medication Adjustment     Wants to discuss changing his Gemtesa due to the cost.     Gait Problem     Complains with being off balance and unsteady gait.     Allergies     Has been cutting grass - now complains with a sore throat and sneezing.        Mr. Hirsch is a 89 y.o. male  who presents for the above.   Cough and wheezing since cutting grass.  Has been getting PT at home.  This has helped him.  His grandson Eladio is with him today.    Allergies  He complains of cough (productive of white sputum) and wheezing. He reports no fever or headaches (had one for 10 minutes and then it went away).          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    left    Atrioventricular block, complete ([facility]) 7/14/2015    S/p pacer    Atrioventricular block, complete ([facility]) 7/14/2015    S/p pacer     Backache, unspecified 7/14/2015    Bladder neck obstruction 7/14/2015    Brachial neuritis or radiculitis NOS 7/14/2015    Bronchiectasis with acute exacerbation ([facility]) 7/14/2015    Cancer ([facility])     prostate    Cardiac pacemaker 3/23/2016    Carpal tunnel syndrome 7/14/2015    Chest pain, unspecified 7/14/2015    Chronic lung disease     Chronic otitis externa 8/4/2016    Chronic renal disease, stage III ([facility]) [[id number]] 7/28/2022    COPD (chronic obstructive pulmonary disease) ([facility]) 3/23/2016    Coronary artery disease involving native coronary artery of native heart with angina

## 2024-05-15 NOTE — PATIENT INSTRUCTIONS
Patient Education         Preventing Falls: Use a Home Safety Checklist (01:07)  Your health professional recommends that you watch this short online health video.  Learn to spot hazards in your home by using a home safety checklist.  Purpose:  Encourages older adults to use a home safety checklist to find possible hazards.  Goal:  The user will learn to use a home safety checklist to spot possible hazards.     How to watch the video    Scan the QR code   OR Visit the website    https://hwi.se/[email or web address]   Current as of: July 17, 2023               Content Version: 14.0  © 2006-2024 [employer].   Care instructions adapted under license by [employer]. If you have questions about a medical condition or this instruction, always ask your healthcare professional. [employer] disclaims any warranty or liability for your use of this information.

## 2024-06-06 RX ORDER — TRAZODONE HYDROCHLORIDE 50 MG/1
50 TABLET ORAL DAILY PRN
Qty: 180 TABLET | Refills: 3 | Status: SHIPPED | OUTPATIENT
Start: 2024-06-06

## 2024-07-25 DIAGNOSIS — I25.10 CORONARY ARTERY DISEASE INVOLVING NATIVE CORONARY ARTERY OF NATIVE HEART WITHOUT ANGINA PECTORIS: ICD-10-CM

## 2024-07-25 DIAGNOSIS — I48.0 PAROXYSMAL ATRIAL FIBRILLATION (HCC): ICD-10-CM

## 2024-07-25 DIAGNOSIS — I47.19 PAT (PAROXYSMAL ATRIAL TACHYCARDIA) (HCC): ICD-10-CM

## 2024-07-25 NOTE — TELEPHONE ENCOUNTER
MEDICATION REFILL REQUEST    Name of Medication:  metoprolol  Dose:  25 mg  Frequency:  qd  Quantity:  30  Days' supply:  90 days    Pharmacy Name/Location:  [facility], [place]

## 2024-07-26 RX ORDER — METOPROLOL SUCCINATE 25 MG/1
25 TABLET, EXTENDED RELEASE ORAL DAILY
Qty: 90 TABLET | Refills: 3 | Status: SHIPPED | OUTPATIENT
Start: 2024-07-26

## 2024-08-14 RX ORDER — BUDESONIDE AND FORMOTEROL FUMARATE DIHYDRATE 160; 4.5 UG/1; UG/1
2 AEROSOL RESPIRATORY (INHALATION) 2 TIMES DAILY
Qty: 3 EACH | Refills: 3 | Status: SHIPPED | OUTPATIENT
Start: 2024-08-14

## 2024-09-19 DIAGNOSIS — J30.1 SEASONAL ALLERGIC RHINITIS DUE TO POLLEN: ICD-10-CM

## 2024-09-19 RX ORDER — FLUTICASONE PROPIONATE 50 MCG
2 SPRAY, SUSPENSION (ML) NASAL DAILY PRN
Qty: 3 EACH | Refills: 3 | Status: SHIPPED | OUTPATIENT
Start: 2024-09-19

## 2024-09-25 DIAGNOSIS — F41.9 ANXIETY: ICD-10-CM

## 2024-09-26 RX ORDER — LORAZEPAM 1 MG/1
TABLET ORAL
Qty: 60 TABLET | Refills: 5 | Status: SHIPPED | OUTPATIENT
Start: 2024-09-26 | End: 2025-09-26

## 2024-11-05 PROCEDURE — 93296 REM INTERROG EVL PM/IDS: CPT | Performed by: INTERNAL MEDICINE

## 2024-11-05 PROCEDURE — 93294 REM INTERROG EVL PM/LDLS PM: CPT | Performed by: INTERNAL MEDICINE

## 2024-11-18 RX ORDER — ESCITALOPRAM OXALATE 5 MG/1
5 TABLET ORAL DAILY
Qty: 90 TABLET | Refills: 3 | Status: SHIPPED | OUTPATIENT
Start: 2024-11-18

## 2024-12-16 ENCOUNTER — OFFICE VISIT (OUTPATIENT)
Dept: INTERNAL MEDICINE CLINIC | Facility: CLINIC | Age: 88
End: 2024-12-16

## 2024-12-16 ENCOUNTER — OFFICE VISIT (OUTPATIENT)
Age: 88
End: 2024-12-16
Payer: MEDICARE

## 2024-12-16 VITALS
DIASTOLIC BLOOD PRESSURE: 74 MMHG | BODY MASS INDEX: 24.75 KG/M2 | SYSTOLIC BLOOD PRESSURE: 132 MMHG | HEIGHT: 64 IN | WEIGHT: 145 LBS | HEART RATE: 68 BPM

## 2024-12-16 VITALS
HEART RATE: 69 BPM | WEIGHT: 144 LBS | DIASTOLIC BLOOD PRESSURE: 63 MMHG | RESPIRATION RATE: 18 BRPM | SYSTOLIC BLOOD PRESSURE: 124 MMHG | BODY MASS INDEX: 24.59 KG/M2 | HEIGHT: 64 IN

## 2024-12-16 DIAGNOSIS — I25.119 CORONARY ARTERY DISEASE INVOLVING NATIVE CORONARY ARTERY OF NATIVE HEART WITH ANGINA PECTORIS (HCC): ICD-10-CM

## 2024-12-16 DIAGNOSIS — R26.81 GAIT INSTABILITY: Primary | ICD-10-CM

## 2024-12-16 DIAGNOSIS — Z23 NEED FOR VACCINATION: ICD-10-CM

## 2024-12-16 DIAGNOSIS — F41.9 ANXIETY: ICD-10-CM

## 2024-12-16 DIAGNOSIS — Z95.0 PACEMAKER: Primary | ICD-10-CM

## 2024-12-16 DIAGNOSIS — R39.9 LOWER URINARY TRACT SYMPTOMS (LUTS): ICD-10-CM

## 2024-12-16 DIAGNOSIS — Z95.0 PACEMAKER: ICD-10-CM

## 2024-12-16 DIAGNOSIS — G60.9 IDIOPATHIC PERIPHERAL NEUROPATHY: ICD-10-CM

## 2024-12-16 DIAGNOSIS — Z00.00 MEDICARE ANNUAL WELLNESS VISIT, SUBSEQUENT: ICD-10-CM

## 2024-12-16 DIAGNOSIS — E78.5 HYPERLIPIDEMIA, UNSPECIFIED HYPERLIPIDEMIA TYPE: ICD-10-CM

## 2024-12-16 DIAGNOSIS — Z12.5 SCREENING FOR PROSTATE CANCER: ICD-10-CM

## 2024-12-16 DIAGNOSIS — I25.118 CORONARY ARTERY DISEASE OF NATIVE ARTERY OF NATIVE HEART WITH STABLE ANGINA PECTORIS (HCC): ICD-10-CM

## 2024-12-16 DIAGNOSIS — J44.9 CHRONIC OBSTRUCTIVE PULMONARY DISEASE, UNSPECIFIED COPD TYPE (HCC): ICD-10-CM

## 2024-12-16 DIAGNOSIS — M54.50 CHRONIC LOW BACK PAIN, UNSPECIFIED BACK PAIN LATERALITY, UNSPECIFIED WHETHER SCIATICA PRESENT: ICD-10-CM

## 2024-12-16 DIAGNOSIS — R35.1 NOCTURIA: ICD-10-CM

## 2024-12-16 DIAGNOSIS — I48.0 PAROXYSMAL ATRIAL FIBRILLATION (HCC): ICD-10-CM

## 2024-12-16 DIAGNOSIS — J45.20 MILD INTERMITTENT ASTHMA WITHOUT COMPLICATION: ICD-10-CM

## 2024-12-16 DIAGNOSIS — G89.29 CHRONIC LOW BACK PAIN, UNSPECIFIED BACK PAIN LATERALITY, UNSPECIFIED WHETHER SCIATICA PRESENT: ICD-10-CM

## 2024-12-16 LAB
ALBUMIN SERPL-MCNC: 3.5 G/DL (ref 3.2–4.6)
ALBUMIN/GLOB SERPL: 1.2 (ref 1–1.9)
ALP SERPL-CCNC: 110 U/L (ref 40–129)
ALT SERPL-CCNC: 9 U/L (ref 8–55)
ANION GAP SERPL CALC-SCNC: 8 MMOL/L (ref 7–16)
AST SERPL-CCNC: 25 U/L (ref 15–37)
BASOPHILS # BLD: 0.1 K/UL (ref 0–0.2)
BASOPHILS NFR BLD: 1 % (ref 0–2)
BILIRUB SERPL-MCNC: 0.7 MG/DL (ref 0–1.2)
BUN SERPL-MCNC: 11 MG/DL (ref 8–23)
CALCIUM SERPL-MCNC: 8.9 MG/DL (ref 8.8–10.2)
CHLORIDE SERPL-SCNC: 104 MMOL/L (ref 98–107)
CHOLEST SERPL-MCNC: 153 MG/DL (ref 0–200)
CO2 SERPL-SCNC: 29 MMOL/L (ref 20–29)
CREAT SERPL-MCNC: 1.07 MG/DL (ref 0.8–1.3)
DIFFERENTIAL METHOD BLD: ABNORMAL
EOSINOPHIL # BLD: 0.3 K/UL (ref 0–0.8)
EOSINOPHIL NFR BLD: 5 % (ref 0.5–7.8)
ERYTHROCYTE [DISTWIDTH] IN BLOOD BY AUTOMATED COUNT: 15.4 % (ref 11.9–14.6)
GLOBULIN SER CALC-MCNC: 2.9 G/DL (ref 2.3–3.5)
GLUCOSE SERPL-MCNC: 100 MG/DL (ref 70–99)
HCT VFR BLD AUTO: 46.2 % (ref 41.1–50.3)
HDLC SERPL-MCNC: 36 MG/DL (ref 40–60)
HDLC SERPL: 4.3 (ref 0–5)
HGB BLD-MCNC: 15.3 G/DL (ref 13.6–17.2)
IMM GRANULOCYTES # BLD AUTO: 0 K/UL (ref 0–0.5)
IMM GRANULOCYTES NFR BLD AUTO: 0 % (ref 0–5)
LDLC SERPL CALC-MCNC: 95 MG/DL (ref 0–100)
LYMPHOCYTES # BLD: 2.2 K/UL (ref 0.5–4.6)
LYMPHOCYTES NFR BLD: 37 % (ref 13–44)
MCH RBC QN AUTO: 34.5 PG (ref 26.1–32.9)
MCHC RBC AUTO-ENTMCNC: 33.1 G/DL (ref 31.4–35)
MCV RBC AUTO: 104.1 FL (ref 82–102)
MONOCYTES # BLD: 0.4 K/UL (ref 0.1–1.3)
MONOCYTES NFR BLD: 7 % (ref 4–12)
NEUTS SEG # BLD: 3 K/UL (ref 1.7–8.2)
NEUTS SEG NFR BLD: 50 % (ref 43–78)
NRBC # BLD: 0 K/UL (ref 0–0.2)
PLATELET # BLD AUTO: 168 K/UL (ref 150–450)
PMV BLD AUTO: 9.9 FL (ref 9.4–12.3)
POTASSIUM SERPL-SCNC: 4.7 MMOL/L (ref 3.5–5.1)
PROT SERPL-MCNC: 6.4 G/DL (ref 6.3–8.2)
PSA SERPL-MCNC: 0.2 NG/ML (ref 0–4)
RBC # BLD AUTO: 4.44 M/UL (ref 4.23–5.6)
SODIUM SERPL-SCNC: 141 MMOL/L (ref 136–145)
TRIGL SERPL-MCNC: 109 MG/DL (ref 0–150)
TSH W FREE THYROID IF ABNORMAL: 0.72 UIU/ML (ref 0.27–4.2)
VLDLC SERPL CALC-MCNC: 22 MG/DL (ref 6–23)
WBC # BLD AUTO: 5.9 K/UL (ref 4.3–11.1)

## 2024-12-16 PROCEDURE — 99214 OFFICE O/P EST MOD 30 MIN: CPT | Performed by: INTERNAL MEDICINE

## 2024-12-16 PROCEDURE — 1159F MED LIST DOCD IN RCRD: CPT | Performed by: INTERNAL MEDICINE

## 2024-12-16 PROCEDURE — 1160F RVW MEDS BY RX/DR IN RCRD: CPT | Performed by: INTERNAL MEDICINE

## 2024-12-16 PROCEDURE — G8482 FLU IMMUNIZE ORDER/ADMIN: HCPCS | Performed by: INTERNAL MEDICINE

## 2024-12-16 PROCEDURE — 1126F AMNT PAIN NOTED NONE PRSNT: CPT | Performed by: INTERNAL MEDICINE

## 2024-12-16 PROCEDURE — G8427 DOCREV CUR MEDS BY ELIG CLIN: HCPCS | Performed by: INTERNAL MEDICINE

## 2024-12-16 PROCEDURE — 1036F TOBACCO NON-USER: CPT | Performed by: INTERNAL MEDICINE

## 2024-12-16 PROCEDURE — G8420 CALC BMI NORM PARAMETERS: HCPCS | Performed by: INTERNAL MEDICINE

## 2024-12-16 PROCEDURE — 1123F ACP DISCUSS/DSCN MKR DOCD: CPT | Performed by: INTERNAL MEDICINE

## 2024-12-16 SDOH — ECONOMIC STABILITY: FOOD INSECURITY: WITHIN THE PAST 12 MONTHS, THE FOOD YOU BOUGHT JUST DIDN'T LAST AND YOU DIDN'T HAVE MONEY TO GET MORE.: NEVER TRUE

## 2024-12-16 SDOH — ECONOMIC STABILITY: INCOME INSECURITY: HOW HARD IS IT FOR YOU TO PAY FOR THE VERY BASICS LIKE FOOD, HOUSING, MEDICAL CARE, AND HEATING?: NOT HARD AT ALL

## 2024-12-16 ASSESSMENT — ENCOUNTER SYMPTOMS
BACK PAIN: 1
ABDOMINAL PAIN: 0
HEMATOCHEZIA: 0
ORTHOPNEA: 0
SHORTNESS OF BREATH: 0
HEMATEMESIS: 0
HOARSE VOICE: 0
WHEEZING: 1
COLOR CHANGE: 0
SPUTUM PRODUCTION: 0
BLURRED VISION: 0
BOWEL INCONTINENCE: 0
DIARRHEA: 0
SHORTNESS OF BREATH: 0
WHEEZING: 0

## 2024-12-16 ASSESSMENT — PATIENT HEALTH QUESTIONNAIRE - PHQ9
10. IF YOU CHECKED OFF ANY PROBLEMS, HOW DIFFICULT HAVE THESE PROBLEMS MADE IT FOR YOU TO DO YOUR WORK, TAKE CARE OF THINGS AT HOME, OR GET ALONG WITH OTHER PEOPLE: NOT DIFFICULT AT ALL
SUM OF ALL RESPONSES TO PHQ QUESTIONS 1-9: 7
SUM OF ALL RESPONSES TO PHQ QUESTIONS 1-9: 7
2. FEELING DOWN, DEPRESSED OR HOPELESS: MORE THAN HALF THE DAYS
6. FEELING BAD ABOUT YOURSELF - OR THAT YOU ARE A FAILURE OR HAVE LET YOURSELF OR YOUR FAMILY DOWN: NOT AT ALL
8. MOVING OR SPEAKING SO SLOWLY THAT OTHER PEOPLE COULD HAVE NOTICED. OR THE OPPOSITE, BEING SO FIGETY OR RESTLESS THAT YOU HAVE BEEN MOVING AROUND A LOT MORE THAN USUAL: NOT AT ALL
4. FEELING TIRED OR HAVING LITTLE ENERGY: MORE THAN HALF THE DAYS
3. TROUBLE FALLING OR STAYING ASLEEP: MORE THAN HALF THE DAYS
5. POOR APPETITE OR OVEREATING: NOT AT ALL
1. LITTLE INTEREST OR PLEASURE IN DOING THINGS: SEVERAL DAYS
7. TROUBLE CONCENTRATING ON THINGS, SUCH AS READING THE NEWSPAPER OR WATCHING TELEVISION: NOT AT ALL
SUM OF ALL RESPONSES TO PHQ QUESTIONS 1-9: 7
9. THOUGHTS THAT YOU WOULD BE BETTER OFF DEAD, OR OF HURTING YOURSELF: NOT AT ALL
SUM OF ALL RESPONSES TO PHQ9 QUESTIONS 1 & 2: 3
SUM OF ALL RESPONSES TO PHQ QUESTIONS 1-9: 7

## 2024-12-16 NOTE — PROGRESS NOTES
11. Need for vaccination  Influenza, FLUAD Trivalent, (age 65 y+), IM, Preservative Free, 0.5mL      12. Lower urinary tract symptoms (LUTS)  PSA, Diagnostic      13. Screening for prostate cancer  PSA, Diagnostic      14. Nocturia  PSA, Diagnostic         Wellness information reviewed and appropriate screening addressed.  Advised patient to secure living will and healthcare POA.  Will refer as above.  Vitals look good.  Is doing fairly well physically and emotionally.  Knows to keep a low threshold for contacting the office with worsening symptoms.  Follow up as documented or earlier as needed.     Return in about 6 months (around 6/16/2025).          Fanta Blount MD  Medicare Annual Wellness Visit    Raymond Cobos [place] is here for Medicare AWV (Medicare wellness) and Leg Pain (Complains with bilateral leg pain - having some cramps in his calves. Also complains with hip pain. )    Assessment & Plan   Gait instability  -     External Referral to Home Health  Medicare annual wellness visit, subsequent  Mild intermittent asthma without complication  Anxiety  Idiopathic peripheral neuropathy  -     External Referral to Home Health  Chronic obstructive pulmonary disease, unspecified COPD type (HCC)  -     External Referral to Home Health  -     CBC with Auto Differential; Future  Paroxysmal atrial fibrillation (HCC)  -     TSH with Reflex; Future  Pacemaker  Coronary artery disease involving native coronary artery of native heart with angina pectoris (HCC)  -     Comprehensive Metabolic Panel; Future  -     TSH with Reflex; Future  -     Lipid Panel; Future  Chronic low back pain, unspecified back pain laterality, unspecified whether sciatica present  -     External Referral to Home Health  Need for vaccination  -     Influenza, FLUAD Trivalent, (age 65 y+), IM, Preservative Free, 0.5mL  Lower urinary tract symptoms (LUTS)  -     PSA, Diagnostic; Future  Screening for prostate cancer  -     PSA, Diagnostic;

## 2024-12-16 NOTE — PROGRESS NOTES
[facility] CARDIOLOGY  [address] [place] [address], [address]  [place] [address]  PHONE: [phone]        24        NAME:  Raymond Hirsch  : 1934  MRN: [id number]       SUBJECTIVE:   Raymond Hirsch is a 89 y.o. male seen for a follow up visit regarding the following: CAD,PAF, and SSS with pacemaker.He returns for annual follow up.    Chief Complaint   Patient presents with    Atrial Fibrillation       HPI:    Atrial Fibrillation  Presents for follow-up visit. Symptoms include chest pain (rare,brief,sharp pain.). Symptoms are negative for an AICD problem, bradycardia, dizziness, hemodynamic instability, hypertension, hypotension, pacemaker problem, palpitations, shortness of breath, syncope, tachycardia and weakness. The symptoms have been stable.       Past Medical History, Past Surgical History, Family history, Social History, and Medications were all reviewed with the patient today and updated as necessary.         Current Outpatient Medications:     Garlic-DHA-EPA (GARLIC/EPA PO), Take by mouth daily, Disp: , Rfl:     respiratory syncytial vaccine, adjuvanted (AREXVY) 120 MCG/0.5ML injection, Inject 0.5 mLs into the muscle once for 1 dose, Disp: 0.5 mL, Rfl: 0    escitalopram (LEXAPRO) 5 MG tablet, TAKE 1 TABLET BY MOUTH EVERY DAY, Disp: 90 tablet, Rfl: 3    LORazepam (ATIVAN) 1 MG tablet, TAKE 1 TABLET BY MOUTH TWICE DAILY . DO NOT EXCEED 2 PER 24 HOURS, Disp: 60 tablet, Rfl: 5    fluticasone (FLONASE) 50 MCG/ACT nasal spray, 2 SPRAYS BY NASAL ROUTE DAILY AS NEEDED FOR ALLERGIES, Disp: 3 each, Rfl: 3    SYMBICORT 160-4.5 MCG/ACT AERO, INHALE 2 PUFFS INTO THE LUNGS TWICE DAILY, Disp: 3 each, Rfl: 3    metoprolol succinate (TOPROL XL) 25 MG extended release tablet, Take 1 tablet by mouth daily, Disp: 90 tablet, Rfl: 3    traZODone (DESYREL) 50 MG tablet, TAKE 1 TABLET BY MOUTH EVERY DAY AT BEDTIME AS NEEDED FOR SLEEP, Disp: 180 tablet, Rfl: 3    mupirocin (BACTROBAN) 2 % ointment, Apply

## 2024-12-16 NOTE — PATIENT INSTRUCTIONS
appointments, and call your doctor if you are having problems. It's also a good idea to know your test results and keep a list of the medicines you take.  What should you include in an advance directive?  Many states have a unique advance directive form. (It may ask you to address specific issues.) Or you might use a universal form that's approved by many states.  If your form doesn't tell you what to address, it may be hard to know what to include in your advance directive. Use the questions below to help you get started.  Who do you want to make decisions about your medical care if you are not able to?  What life-support measures do you want if you have a serious illness that gets worse over time or can't be cured?  What are you most afraid of that might happen? (Maybe you're afraid of having pain, losing your independence, or being kept alive by machines.)  Where would you prefer to die? (Your home? A hospital? A nursing home?)  Do you want to donate your organs when you die?  Do you want certain Orthodox practices performed before you die?  When should you call for help?  Be sure to contact your doctor if you have any questions.  Where can you learn more?  Go to https://www.[employer].net/patientEd and enter R264 to learn more about \"Advance Directives: Care Instructions.\"  Current as of: November 16, 2023  Content Version: 14.2  © 2024 [employer].   Care instructions adapted under license by [employer]. If you have questions about a medical condition or this instruction, always ask your healthcare professional. [employer] disclaims any warranty or liability for your use of this information.           A Healthy Heart: Care Instructions  Overview     Coronary artery disease, also called heart disease, occurs when a substance called plaque builds up in the vessels that supply oxygen-rich blood to your heart muscle. This can narrow the blood vessels and reduce blood flow. A heart attack

## 2024-12-17 ENCOUNTER — TELEPHONE (OUTPATIENT)
Dept: INTERNAL MEDICINE CLINIC | Facility: CLINIC | Age: 88
End: 2024-12-17

## 2024-12-17 NOTE — TELEPHONE ENCOUNTER
----- Message from Dr. Fanta Blount MD sent at 12/17/2024 12:10 PM EST -----  Labs are stable.  Continue your current doses of medications. Keep up the good work.  Thanks.  [facility]

## 2024-12-17 NOTE — RESULT ENCOUNTER NOTE
Labs are stable.  Continue your current doses of medications. Keep up the good work.  Thanks.  [facility]

## 2025-01-24 ENCOUNTER — NURSE ONLY (OUTPATIENT)
Age: 89
End: 2025-01-24

## 2025-01-24 DIAGNOSIS — Z95.0 PACEMAKER: Primary | ICD-10-CM

## 2025-01-24 DIAGNOSIS — I48.0 PAROXYSMAL ATRIAL FIBRILLATION (HCC): ICD-10-CM

## 2025-02-06 ENCOUNTER — TELEPHONE (OUTPATIENT)
Dept: INTERNAL MEDICINE CLINIC | Facility: CLINIC | Age: 89
End: 2025-02-06

## 2025-02-06 NOTE — TELEPHONE ENCOUNTER
HEATHER:    Mr. Hirsch had a fall this morning. He wasn' using his walker.  He hit his face with some bruising on nose and forehead. There is no other complaints. No dizziness.  He refused EMS to be checked out.

## 2025-02-10 NOTE — TELEPHONE ENCOUNTER
I have talked with Mr. Hirsch. He stated that on February 6th, he had came out of the bathroom and he has passed out. He had busted his nose and his forehead.  He said that he is feeling good now. He has had no headaches, no blood pressure issues.  His legs are giving him problems walking. He is using a walking stick to help him get around. He also has a walker, but isn't using it much.

## 2025-02-10 NOTE — TELEPHONE ENCOUNTER
Attempted to contact the pt to schedule an appointment. No answer and not enough space to leave a message. 02/10

## 2025-02-11 NOTE — TELEPHONE ENCOUNTER
I have talked with Mr. Hirsch and have him scheduled on Thursday with Dr. Blount. He said if he could find a ride here, he would be here.

## 2025-02-13 ENCOUNTER — OFFICE VISIT (OUTPATIENT)
Dept: INTERNAL MEDICINE CLINIC | Facility: CLINIC | Age: 89
End: 2025-02-13
Payer: MEDICARE

## 2025-02-13 VITALS
DIASTOLIC BLOOD PRESSURE: 59 MMHG | WEIGHT: 146 LBS | HEART RATE: 45 BPM | BODY MASS INDEX: 24.92 KG/M2 | RESPIRATION RATE: 18 BRPM | HEIGHT: 64 IN | SYSTOLIC BLOOD PRESSURE: 110 MMHG

## 2025-02-13 DIAGNOSIS — S00.83XA CONTUSION OF FACE, INITIAL ENCOUNTER: ICD-10-CM

## 2025-02-13 DIAGNOSIS — I48.0 PAROXYSMAL ATRIAL FIBRILLATION (HCC): ICD-10-CM

## 2025-02-13 DIAGNOSIS — J44.9 CHRONIC OBSTRUCTIVE PULMONARY DISEASE, UNSPECIFIED COPD TYPE (HCC): ICD-10-CM

## 2025-02-13 DIAGNOSIS — R55 SYNCOPE, UNSPECIFIED SYNCOPE TYPE: Primary | ICD-10-CM

## 2025-02-13 DIAGNOSIS — Z95.0 PACEMAKER: ICD-10-CM

## 2025-02-13 DIAGNOSIS — R55 SYNCOPE, UNSPECIFIED SYNCOPE TYPE: ICD-10-CM

## 2025-02-13 LAB
ANION GAP SERPL CALC-SCNC: 8 MMOL/L (ref 7–16)
BASOPHILS # BLD: 0.07 K/UL (ref 0–0.2)
BASOPHILS NFR BLD: 1 % (ref 0–2)
BUN SERPL-MCNC: 14 MG/DL (ref 8–23)
CALCIUM SERPL-MCNC: 8.8 MG/DL (ref 8.8–10.2)
CHLORIDE SERPL-SCNC: 102 MMOL/L (ref 98–107)
CO2 SERPL-SCNC: 29 MMOL/L (ref 20–29)
CREAT SERPL-MCNC: 1.1 MG/DL (ref 0.8–1.3)
DIFFERENTIAL METHOD BLD: ABNORMAL
EOSINOPHIL # BLD: 0.24 K/UL (ref 0–0.8)
EOSINOPHIL NFR BLD: 3.4 % (ref 0.5–7.8)
ERYTHROCYTE [DISTWIDTH] IN BLOOD BY AUTOMATED COUNT: 14.6 % (ref 11.9–14.6)
GLUCOSE SERPL-MCNC: 86 MG/DL (ref 70–99)
HCT VFR BLD AUTO: 44.7 % (ref 41.1–50.3)
HGB BLD-MCNC: 14.7 G/DL (ref 13.6–17.2)
IMM GRANULOCYTES # BLD AUTO: 0.03 K/UL (ref 0–0.5)
IMM GRANULOCYTES NFR BLD AUTO: 0.4 % (ref 0–5)
LYMPHOCYTES # BLD: 2.38 K/UL (ref 0.5–4.6)
LYMPHOCYTES NFR BLD: 33.6 % (ref 13–44)
MCH RBC QN AUTO: 35.5 PG (ref 26.1–32.9)
MCHC RBC AUTO-ENTMCNC: 32.9 G/DL (ref 31.4–35)
MCV RBC AUTO: 108 FL (ref 82–102)
MONOCYTES # BLD: 0.54 K/UL (ref 0.1–1.3)
MONOCYTES NFR BLD: 7.6 % (ref 4–12)
NEUTS SEG # BLD: 3.82 K/UL (ref 1.7–8.2)
NEUTS SEG NFR BLD: 54 % (ref 43–78)
NRBC # BLD: 0 K/UL (ref 0–0.2)
PLATELET # BLD AUTO: 206 K/UL (ref 150–450)
PMV BLD AUTO: 9.9 FL (ref 9.4–12.3)
POTASSIUM SERPL-SCNC: 4.6 MMOL/L (ref 3.5–5.1)
RBC # BLD AUTO: 4.14 M/UL (ref 4.23–5.6)
SODIUM SERPL-SCNC: 139 MMOL/L (ref 136–145)
WBC # BLD AUTO: 7.1 K/UL (ref 4.3–11.1)

## 2025-02-13 PROCEDURE — 1036F TOBACCO NON-USER: CPT | Performed by: INTERNAL MEDICINE

## 2025-02-13 PROCEDURE — G8427 DOCREV CUR MEDS BY ELIG CLIN: HCPCS | Performed by: INTERNAL MEDICINE

## 2025-02-13 PROCEDURE — 1159F MED LIST DOCD IN RCRD: CPT | Performed by: INTERNAL MEDICINE

## 2025-02-13 PROCEDURE — 1123F ACP DISCUSS/DSCN MKR DOCD: CPT | Performed by: INTERNAL MEDICINE

## 2025-02-13 PROCEDURE — 99214 OFFICE O/P EST MOD 30 MIN: CPT | Performed by: INTERNAL MEDICINE

## 2025-02-13 PROCEDURE — 1160F RVW MEDS BY RX/DR IN RCRD: CPT | Performed by: INTERNAL MEDICINE

## 2025-02-13 PROCEDURE — G8417 CALC BMI ABV UP PARAM F/U: HCPCS | Performed by: INTERNAL MEDICINE

## 2025-02-13 PROCEDURE — 3023F SPIROM DOC REV: CPT | Performed by: INTERNAL MEDICINE

## 2025-02-13 RX ORDER — METOPROLOL SUCCINATE 25 MG/1
12.5 TABLET, EXTENDED RELEASE ORAL DAILY
Qty: 90 TABLET | Refills: 3 | Status: SHIPPED
Start: 2025-02-13

## 2025-02-13 SDOH — ECONOMIC STABILITY: FOOD INSECURITY: WITHIN THE PAST 12 MONTHS, YOU WORRIED THAT YOUR FOOD WOULD RUN OUT BEFORE YOU GOT MONEY TO BUY MORE.: NEVER TRUE

## 2025-02-13 ASSESSMENT — ENCOUNTER SYMPTOMS
DIARRHEA: 0
CONSTIPATION: 0
SHORTNESS OF BREATH: 0
NAUSEA: 0

## 2025-02-13 ASSESSMENT — PATIENT HEALTH QUESTIONNAIRE - PHQ9
9. THOUGHTS THAT YOU WOULD BE BETTER OFF DEAD, OR OF HURTING YOURSELF: NOT AT ALL
8. MOVING OR SPEAKING SO SLOWLY THAT OTHER PEOPLE COULD HAVE NOTICED. OR THE OPPOSITE, BEING SO FIGETY OR RESTLESS THAT YOU HAVE BEEN MOVING AROUND A LOT MORE THAN USUAL: NOT AT ALL
SUM OF ALL RESPONSES TO PHQ9 QUESTIONS 1 & 2: 0
SUM OF ALL RESPONSES TO PHQ QUESTIONS 1-9: 2
SUM OF ALL RESPONSES TO PHQ QUESTIONS 1-9: 2
10. IF YOU CHECKED OFF ANY PROBLEMS, HOW DIFFICULT HAVE THESE PROBLEMS MADE IT FOR YOU TO DO YOUR WORK, TAKE CARE OF THINGS AT HOME, OR GET ALONG WITH OTHER PEOPLE: NOT DIFFICULT AT ALL
5. POOR APPETITE OR OVEREATING: NOT AT ALL
1. LITTLE INTEREST OR PLEASURE IN DOING THINGS: NOT AT ALL
2. FEELING DOWN, DEPRESSED OR HOPELESS: NOT AT ALL
6. FEELING BAD ABOUT YOURSELF - OR THAT YOU ARE A FAILURE OR HAVE LET YOURSELF OR YOUR FAMILY DOWN: NOT AT ALL
7. TROUBLE CONCENTRATING ON THINGS, SUCH AS READING THE NEWSPAPER OR WATCHING TELEVISION: NOT AT ALL
4. FEELING TIRED OR HAVING LITTLE ENERGY: SEVERAL DAYS
SUM OF ALL RESPONSES TO PHQ QUESTIONS 1-9: 2
SUM OF ALL RESPONSES TO PHQ QUESTIONS 1-9: 2
3. TROUBLE FALLING OR STAYING ASLEEP: SEVERAL DAYS

## 2025-02-13 NOTE — PROGRESS NOTES
2/13/2025 11:58 AM  Location:[facility] PHYSICIAN SERVICES  [facility] [place]  Patient #:  [id number]  YOB: 1934            History of Present Illness     Chief Complaint   Patient presents with    Fall     Had a fall and passed out on 2/6/2025 - Hit his head.     Extremity Weakness     Having some weakness and pain in his legs.        Mr. Hirsch is a 89 y.o. male  who presents for the above.   Had this episode after sitting on the commode for a few minutes.  Denies ever passing out related to having a bowel movement in the past.  Was aware of his surroundings immediately.             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    Atrioventricular block, complete ([facility]) 7/14/2015    S/p pacer    Atrioventricular block, complete ([facility]) 7/14/2015    S/p pacer     Backache, unspecified 7/14/2015    Bladder neck obstruction 7/14/2015    Brachial neuritis or radiculitis NOS 7/14/2015    Bronchiectasis with acute exacerbation ([facility]) 7/14/2015    Cancer ([facility])     prostate    Cardiac pacemaker 3/23/2016    Carpal tunnel syndrome 7/14/2015    Chest pain, unspecified 7/14/2015    Chronic lung disease     Chronic otitis externa 8/4/2016    Chronic renal disease, stage III ([facility]) [[id number]] 7/28/2022    COPD (chronic obstructive pulmonary disease) ([facility]) 3/23/2016    Coronary artery disease involving native coronary artery of native heart with angina pectoris ([facility]) 4/22/2016    Coronary artery disease involving native coronary artery of native heart without angina pectoris 08/04/2011    LHC:mild to moderate CAD with normal LV EF.    Dyspepsia and other specified disorders of function of

## 2025-02-14 NOTE — RESULT ENCOUNTER NOTE
Labs are stable.  Continue your current doses of medications. Keep up the good work.  Thanks.  [facility]

## 2025-02-20 ENCOUNTER — TELEPHONE (OUTPATIENT)
Dept: INTERNAL MEDICINE CLINIC | Facility: CLINIC | Age: 89
End: 2025-02-20

## 2025-02-20 NOTE — TELEPHONE ENCOUNTER
----- Message from Dr. Fanta Blount MD sent at 2/14/2025  4:21 PM EST -----  Labs are stable.  Continue your current doses of medications. Keep up the good work.  Thanks.  [facility]

## 2025-02-27 ENCOUNTER — TELEPHONE (OUTPATIENT)
Dept: INTERNAL MEDICINE CLINIC | Facility: CLINIC | Age: 89
End: 2025-02-27

## 2025-02-27 NOTE — TELEPHONE ENCOUNTER
Call and determine how he is doing after his recent fall. Thanks.  [facility]

## 2025-03-01 DIAGNOSIS — F41.9 ANXIETY: ICD-10-CM

## 2025-03-03 RX ORDER — LORAZEPAM 1 MG/1
TABLET ORAL
Qty: 60 TABLET | Refills: 1 | Status: SHIPPED | OUTPATIENT
Start: 2025-03-03 | End: 2026-03-03

## 2025-03-12 RX ORDER — LEVOTHYROXINE SODIUM 50 UG/1
50 TABLET ORAL
Qty: 90 TABLET | Refills: 3 | Status: SHIPPED | OUTPATIENT
Start: 2025-03-12

## 2025-04-30 ENCOUNTER — TELEPHONE (OUTPATIENT)
Dept: INTERNAL MEDICINE CLINIC | Facility: CLINIC | Age: 89
End: 2025-04-30

## 2025-04-30 RX ORDER — MALATHION 0 G/ML
LOTION TOPICAL
Qty: 59 ML | Refills: 0 | Status: SHIPPED | OUTPATIENT
Start: 2025-04-30 | End: 2025-05-03

## 2025-04-30 NOTE — TELEPHONE ENCOUNTER
. Joycelyn wants to know if Dr. Blount can call in something for him of head lice?  He has had it a cou[l;e of weeks.      Pharmacy  [facility]     He is also asking if the Lorazepam can be sent to [facility] in [place] for his next refills?

## 2025-05-01 DIAGNOSIS — F41.9 ANXIETY: ICD-10-CM

## 2025-05-01 RX ORDER — LORAZEPAM 1 MG/1
1 TABLET ORAL EVERY 8 HOURS PRN
Qty: 60 TABLET | Refills: 1 | Status: SHIPPED | OUTPATIENT
Start: 2025-05-01 | End: 2026-05-01

## 2025-05-06 PROCEDURE — 93296 REM INTERROG EVL PM/IDS: CPT | Performed by: INTERNAL MEDICINE

## 2025-05-06 PROCEDURE — 93294 REM INTERROG EVL PM/LDLS PM: CPT | Performed by: INTERNAL MEDICINE

## 2025-06-06 ENCOUNTER — OFFICE VISIT (OUTPATIENT)
Age: 89
End: 2025-06-06
Payer: MEDICARE

## 2025-06-06 VITALS
WEIGHT: 149 LBS | BODY MASS INDEX: 25.44 KG/M2 | OXYGEN SATURATION: 94 % | HEIGHT: 64 IN | RESPIRATION RATE: 22 BRPM | HEART RATE: 69 BPM | DIASTOLIC BLOOD PRESSURE: 66 MMHG | SYSTOLIC BLOOD PRESSURE: 124 MMHG

## 2025-06-06 DIAGNOSIS — I48.0 PAROXYSMAL ATRIAL FIBRILLATION (HCC): ICD-10-CM

## 2025-06-06 DIAGNOSIS — I25.118 CORONARY ARTERY DISEASE OF NATIVE ARTERY OF NATIVE HEART WITH STABLE ANGINA PECTORIS: Primary | ICD-10-CM

## 2025-06-06 DIAGNOSIS — Z95.0 PACEMAKER: ICD-10-CM

## 2025-06-06 PROCEDURE — 1123F ACP DISCUSS/DSCN MKR DOCD: CPT | Performed by: INTERNAL MEDICINE

## 2025-06-06 PROCEDURE — G8427 DOCREV CUR MEDS BY ELIG CLIN: HCPCS | Performed by: INTERNAL MEDICINE

## 2025-06-06 PROCEDURE — 1160F RVW MEDS BY RX/DR IN RCRD: CPT | Performed by: INTERNAL MEDICINE

## 2025-06-06 PROCEDURE — 99214 OFFICE O/P EST MOD 30 MIN: CPT | Performed by: INTERNAL MEDICINE

## 2025-06-06 PROCEDURE — 1036F TOBACCO NON-USER: CPT | Performed by: INTERNAL MEDICINE

## 2025-06-06 PROCEDURE — 1159F MED LIST DOCD IN RCRD: CPT | Performed by: INTERNAL MEDICINE

## 2025-06-06 PROCEDURE — G8417 CALC BMI ABV UP PARAM F/U: HCPCS | Performed by: INTERNAL MEDICINE

## 2025-06-06 PROCEDURE — 1126F AMNT PAIN NOTED NONE PRSNT: CPT | Performed by: INTERNAL MEDICINE

## 2025-06-06 ASSESSMENT — ENCOUNTER SYMPTOMS
WHEEZING: 0
BOWEL INCONTINENCE: 0
COLOR CHANGE: 0
ABDOMINAL PAIN: 0
SPUTUM PRODUCTION: 0
SHORTNESS OF BREATH: 0
CHEST TIGHTNESS: 0
HOARSE VOICE: 0
BLURRED VISION: 0
DIARRHEA: 0
ORTHOPNEA: 0
HEMATOCHEZIA: 0
HEMATEMESIS: 0

## 2025-06-06 NOTE — PROGRESS NOTES
[facility] CARDIOLOGY  [address] [place] [address], [facility] [address]  [place] [address]  PHONE: [phone]        25        NAME:  Raymond Hirsch  : 1934  MRN: [id number]       SUBJECTIVE:   Raymond Hirsch is a 89 y.o. male seen for a follow up visit regarding the following: CAD, paroxysmal atrial fib, sick sinus syndrome with pacemaker.  He returns for scheduled follow-up.  He complains of leg weakness and occasional loss of balance.    Chief Complaint   Patient presents with    Other     Paroxysmal atrial fibrillation       HPI:    Coronary Artery Disease  Presents for follow-up visit. Symptoms include dizziness and muscle weakness (leg weakness). Pertinent negatives include no chest pain, chest pressure, chest tightness, leg swelling, palpitations, shortness of breath or weight gain. The symptoms have been stable.       Past Medical History, Past Surgical History, Family history, Social History, and Medications were all reviewed with the patient today and updated as necessary.         Current Outpatient Medications:     LORazepam (ATIVAN) 1 MG tablet, Take 1 tablet by mouth every 8 hours as needed for Anxiety. Max Daily Amount: 3 mg, Disp: 60 tablet, Rfl: 1    levothyroxine (SYNTHROID) 50 MCG tablet, TAKE 1 TABLET BY MOUTH EVERY DAY BEFORE BREAKFAST, Disp: 90 tablet, Rfl: 3    metoprolol succinate (TOPROL XL) 25 MG extended release tablet, Take 0.5 tablets by mouth daily, Disp: 90 tablet, Rfl: 3    escitalopram (LEXAPRO) 5 MG tablet, TAKE 1 TABLET BY MOUTH EVERY DAY, Disp: 90 tablet, Rfl: 3    fluticasone (FLONASE) 50 MCG/ACT nasal spray, 2 SPRAYS BY NASAL ROUTE DAILY AS NEEDED FOR ALLERGIES, Disp: 3 each, Rfl: 3    SYMBICORT 160-4.5 MCG/ACT AERO, INHALE 2 PUFFS INTO THE LUNGS TWICE DAILY, Disp: 3 each, Rfl: 3    traZODone (DESYREL) 50 MG tablet, TAKE 1 TABLET BY MOUTH EVERY DAY AT BEDTIME AS NEEDED FOR SLEEP, Disp: 180 tablet, Rfl: 3    mupirocin (BACTROBAN) 2 % ointment, Apply topically 3 times

## 2025-06-27 DIAGNOSIS — F41.9 ANXIETY: ICD-10-CM

## 2025-06-27 RX ORDER — LORAZEPAM 1 MG/1
1 TABLET ORAL EVERY 8 HOURS PRN
Qty: 60 TABLET | Refills: 1 | Status: SHIPPED | OUTPATIENT
Start: 2025-06-27 | End: 2026-06-27

## 2025-07-30 DIAGNOSIS — J30.1 SEASONAL ALLERGIC RHINITIS DUE TO POLLEN: ICD-10-CM

## 2025-07-31 RX ORDER — FLUTICASONE PROPIONATE 50 MCG
2 SPRAY, SUSPENSION (ML) NASAL DAILY PRN
Qty: 48 ML | Refills: 3 | Status: SHIPPED | OUTPATIENT
Start: 2025-07-31

## 2025-07-31 RX ORDER — MALATHION 0 G/ML
LOTION TOPICAL
Qty: 59 ML | Refills: 0 | Status: SHIPPED | OUTPATIENT
Start: 2025-07-31

## 2025-08-05 PROCEDURE — 93296 REM INTERROG EVL PM/IDS: CPT | Performed by: INTERNAL MEDICINE

## 2025-08-05 PROCEDURE — 93294 REM INTERROG EVL PM/LDLS PM: CPT | Performed by: INTERNAL MEDICINE

## 2025-08-27 RX ORDER — ESCITALOPRAM OXALATE 5 MG/1
5 TABLET ORAL DAILY
Qty: 90 TABLET | Refills: 3 | Status: SHIPPED | OUTPATIENT
Start: 2025-08-27 | End: 2025-08-28 | Stop reason: SDUPTHER

## 2025-08-28 ENCOUNTER — OFFICE VISIT (OUTPATIENT)
Dept: INTERNAL MEDICINE CLINIC | Facility: CLINIC | Age: 89
End: 2025-08-28
Payer: MEDICARE

## 2025-08-28 VITALS
WEIGHT: 151 LBS | SYSTOLIC BLOOD PRESSURE: 118 MMHG | HEIGHT: 64 IN | DIASTOLIC BLOOD PRESSURE: 59 MMHG | BODY MASS INDEX: 25.78 KG/M2 | RESPIRATION RATE: 16 BRPM | HEART RATE: 74 BPM

## 2025-08-28 DIAGNOSIS — Z00.00 MEDICARE ANNUAL WELLNESS VISIT, SUBSEQUENT: ICD-10-CM

## 2025-08-28 DIAGNOSIS — F41.9 ANXIETY: ICD-10-CM

## 2025-08-28 DIAGNOSIS — Z85.46 HISTORY OF MALIGNANT NEOPLASM OF PROSTATE: ICD-10-CM

## 2025-08-28 DIAGNOSIS — I25.119 CORONARY ARTERY DISEASE INVOLVING NATIVE CORONARY ARTERY OF NATIVE HEART WITH ANGINA PECTORIS: ICD-10-CM

## 2025-08-28 DIAGNOSIS — J44.9 CHRONIC OBSTRUCTIVE PULMONARY DISEASE, UNSPECIFIED COPD TYPE (HCC): ICD-10-CM

## 2025-08-28 DIAGNOSIS — R53.83 FATIGUE, UNSPECIFIED TYPE: ICD-10-CM

## 2025-08-28 DIAGNOSIS — F51.01 PRIMARY INSOMNIA: ICD-10-CM

## 2025-08-28 DIAGNOSIS — I48.0 PAROXYSMAL ATRIAL FIBRILLATION (HCC): ICD-10-CM

## 2025-08-28 DIAGNOSIS — M79.604 PAIN IN BOTH LOWER EXTREMITIES: Primary | ICD-10-CM

## 2025-08-28 DIAGNOSIS — R29.898 WEAKNESS OF BOTH LOWER EXTREMITIES: ICD-10-CM

## 2025-08-28 DIAGNOSIS — F33.41 RECURRENT MAJOR DEPRESSIVE DISORDER, IN PARTIAL REMISSION: ICD-10-CM

## 2025-08-28 DIAGNOSIS — G60.9 IDIOPATHIC PERIPHERAL NEUROPATHY: ICD-10-CM

## 2025-08-28 DIAGNOSIS — K21.9 GASTROESOPHAGEAL REFLUX DISEASE, UNSPECIFIED WHETHER ESOPHAGITIS PRESENT: ICD-10-CM

## 2025-08-28 DIAGNOSIS — M79.605 PAIN IN BOTH LOWER EXTREMITIES: Primary | ICD-10-CM

## 2025-08-28 LAB
ALBUMIN SERPL-MCNC: 3.4 G/DL (ref 3.2–4.6)
ALBUMIN/GLOB SERPL: 1.3 (ref 1–1.9)
ALP SERPL-CCNC: 116 U/L (ref 40–129)
ALT SERPL-CCNC: 10 U/L (ref 8–55)
ANION GAP SERPL CALC-SCNC: 7 MMOL/L (ref 7–16)
AST SERPL-CCNC: 21 U/L (ref 15–37)
BASOPHILS # BLD: 0.05 K/UL (ref 0–0.2)
BASOPHILS NFR BLD: 0.7 % (ref 0–2)
BILIRUB SERPL-MCNC: 0.7 MG/DL (ref 0–1.2)
BUN SERPL-MCNC: 12 MG/DL (ref 8–23)
CALCIUM SERPL-MCNC: 8.8 MG/DL (ref 8.8–10.2)
CHLORIDE SERPL-SCNC: 105 MMOL/L (ref 98–107)
CHOLEST SERPL-MCNC: 158 MG/DL (ref 0–200)
CO2 SERPL-SCNC: 27 MMOL/L (ref 20–29)
CREAT SERPL-MCNC: 1.02 MG/DL (ref 0.8–1.3)
DIFFERENTIAL METHOD BLD: ABNORMAL
EOSINOPHIL # BLD: 0.2 K/UL (ref 0–0.8)
EOSINOPHIL NFR BLD: 2.8 % (ref 0.5–7.8)
ERYTHROCYTE [DISTWIDTH] IN BLOOD BY AUTOMATED COUNT: 13.6 % (ref 11.9–14.6)
ERYTHROCYTE [SEDIMENTATION RATE] IN BLOOD: 4 MM/HR
GLOBULIN SER CALC-MCNC: 2.6 G/DL (ref 2.3–3.5)
GLUCOSE SERPL-MCNC: 99 MG/DL (ref 70–99)
HCT VFR BLD AUTO: 44 % (ref 41.1–50.3)
HDLC SERPL-MCNC: 40 MG/DL (ref 40–60)
HDLC SERPL: 3.9 (ref 0–5)
HGB BLD-MCNC: 14.3 G/DL (ref 13.6–17.2)
IMM GRANULOCYTES # BLD AUTO: 0.02 K/UL (ref 0–0.5)
IMM GRANULOCYTES NFR BLD AUTO: 0.3 % (ref 0–5)
LDLC SERPL CALC-MCNC: 91 MG/DL (ref 0–100)
LYMPHOCYTES # BLD: 2.14 K/UL (ref 0.5–4.6)
LYMPHOCYTES NFR BLD: 30.2 % (ref 13–44)
MCH RBC QN AUTO: 36 PG (ref 26.1–32.9)
MCHC RBC AUTO-ENTMCNC: 32.5 G/DL (ref 31.4–35)
MCV RBC AUTO: 110.8 FL (ref 82–102)
MONOCYTES # BLD: 0.57 K/UL (ref 0.1–1.3)
MONOCYTES NFR BLD: 8.1 % (ref 4–12)
NEUTS SEG # BLD: 4.1 K/UL (ref 1.7–8.2)
NEUTS SEG NFR BLD: 57.9 % (ref 43–78)
NRBC # BLD: 0 K/UL (ref 0–0.2)
PLATELET # BLD AUTO: 228 K/UL (ref 150–450)
PMV BLD AUTO: 9.8 FL (ref 9.4–12.3)
POTASSIUM SERPL-SCNC: 4.2 MMOL/L (ref 3.5–5.1)
PROT SERPL-MCNC: 6 G/DL (ref 6.3–8.2)
RBC # BLD AUTO: 3.97 M/UL (ref 4.23–5.6)
SODIUM SERPL-SCNC: 139 MMOL/L (ref 136–145)
TRIGL SERPL-MCNC: 136 MG/DL (ref 0–150)
TSH W FREE THYROID IF ABNORMAL: 2.05 UIU/ML (ref 0.27–4.2)
VLDLC SERPL CALC-MCNC: 27 MG/DL (ref 6–23)
WBC # BLD AUTO: 7.1 K/UL (ref 4.3–11.1)

## 2025-08-28 PROCEDURE — 99214 OFFICE O/P EST MOD 30 MIN: CPT | Performed by: INTERNAL MEDICINE

## 2025-08-28 PROCEDURE — G8427 DOCREV CUR MEDS BY ELIG CLIN: HCPCS | Performed by: INTERNAL MEDICINE

## 2025-08-28 PROCEDURE — 1160F RVW MEDS BY RX/DR IN RCRD: CPT | Performed by: INTERNAL MEDICINE

## 2025-08-28 PROCEDURE — G0439 PPPS, SUBSEQ VISIT: HCPCS | Performed by: INTERNAL MEDICINE

## 2025-08-28 PROCEDURE — 3023F SPIROM DOC REV: CPT | Performed by: INTERNAL MEDICINE

## 2025-08-28 PROCEDURE — 1123F ACP DISCUSS/DSCN MKR DOCD: CPT | Performed by: INTERNAL MEDICINE

## 2025-08-28 PROCEDURE — G8417 CALC BMI ABV UP PARAM F/U: HCPCS | Performed by: INTERNAL MEDICINE

## 2025-08-28 PROCEDURE — 1159F MED LIST DOCD IN RCRD: CPT | Performed by: INTERNAL MEDICINE

## 2025-08-28 PROCEDURE — 1036F TOBACCO NON-USER: CPT | Performed by: INTERNAL MEDICINE

## 2025-08-28 PROCEDURE — G2211 COMPLEX E/M VISIT ADD ON: HCPCS | Performed by: INTERNAL MEDICINE

## 2025-08-28 RX ORDER — ESCITALOPRAM OXALATE 5 MG/1
5 TABLET ORAL DAILY
Qty: 90 TABLET | Refills: 3 | Status: SHIPPED | OUTPATIENT
Start: 2025-08-28

## 2025-08-28 RX ORDER — LORAZEPAM 1 MG/1
1 TABLET ORAL EVERY 8 HOURS PRN
Qty: 60 TABLET | Refills: 1 | Status: SHIPPED | OUTPATIENT
Start: 2025-08-28 | End: 2026-08-28

## 2025-08-28 RX ORDER — ALBUTEROL SULFATE 90 UG/1
2 INHALANT RESPIRATORY (INHALATION) 4 TIMES DAILY PRN
Qty: 54 G | Refills: 1 | Status: SHIPPED | OUTPATIENT
Start: 2025-08-28

## 2025-08-28 RX ORDER — LEVOTHYROXINE SODIUM 50 UG/1
50 TABLET ORAL
Qty: 90 TABLET | Refills: 3 | Status: SHIPPED | OUTPATIENT
Start: 2025-08-28

## 2025-08-28 RX ORDER — BUDESONIDE AND FORMOTEROL FUMARATE DIHYDRATE 160; 4.5 UG/1; UG/1
2 AEROSOL RESPIRATORY (INHALATION) 2 TIMES DAILY
Qty: 3 EACH | Refills: 3 | Status: SHIPPED | OUTPATIENT
Start: 2025-08-28

## 2025-08-28 RX ORDER — GABAPENTIN 100 MG/1
100 CAPSULE ORAL 3 TIMES DAILY PRN
Qty: 90 CAPSULE | Refills: 0 | Status: SHIPPED
Start: 2025-08-28 | End: 2025-11-26

## 2025-08-28 RX ORDER — GABAPENTIN 100 MG/1
100 CAPSULE ORAL DAILY
COMMUNITY
End: 2025-08-28 | Stop reason: SDUPTHER

## 2025-08-28 RX ORDER — FAMOTIDINE 20 MG/1
20 TABLET, FILM COATED ORAL 2 TIMES DAILY
Qty: 180 TABLET | Refills: 1 | Status: SHIPPED | OUTPATIENT
Start: 2025-08-28

## 2025-08-28 ASSESSMENT — ENCOUNTER SYMPTOMS
CONSTIPATION: 1
SHORTNESS OF BREATH: 1
WHEEZING: 1
COUGH: 1

## 2025-08-28 ASSESSMENT — LIFESTYLE VARIABLES
HOW OFTEN DO YOU HAVE A DRINK CONTAINING ALCOHOL: NEVER
HOW MANY STANDARD DRINKS CONTAINING ALCOHOL DO YOU HAVE ON A TYPICAL DAY: PATIENT DOES NOT DRINK

## 2025-08-28 ASSESSMENT — PATIENT HEALTH QUESTIONNAIRE - PHQ9
3. TROUBLE FALLING OR STAYING ASLEEP: MORE THAN HALF THE DAYS
2. FEELING DOWN, DEPRESSED OR HOPELESS: SEVERAL DAYS
9. THOUGHTS THAT YOU WOULD BE BETTER OFF DEAD, OR OF HURTING YOURSELF: NOT AT ALL
10. IF YOU CHECKED OFF ANY PROBLEMS, HOW DIFFICULT HAVE THESE PROBLEMS MADE IT FOR YOU TO DO YOUR WORK, TAKE CARE OF THINGS AT HOME, OR GET ALONG WITH OTHER PEOPLE: SOMEWHAT DIFFICULT
SUM OF ALL RESPONSES TO PHQ QUESTIONS 1-9: 10
8. MOVING OR SPEAKING SO SLOWLY THAT OTHER PEOPLE COULD HAVE NOTICED. OR THE OPPOSITE, BEING SO FIGETY OR RESTLESS THAT YOU HAVE BEEN MOVING AROUND A LOT MORE THAN USUAL: NEARLY EVERY DAY
SUM OF ALL RESPONSES TO PHQ QUESTIONS 1-9: 10
SUM OF ALL RESPONSES TO PHQ QUESTIONS 1-9: 10
1. LITTLE INTEREST OR PLEASURE IN DOING THINGS: SEVERAL DAYS
5. POOR APPETITE OR OVEREATING: NOT AT ALL
7. TROUBLE CONCENTRATING ON THINGS, SUCH AS READING THE NEWSPAPER OR WATCHING TELEVISION: NOT AT ALL
4. FEELING TIRED OR HAVING LITTLE ENERGY: NEARLY EVERY DAY
6. FEELING BAD ABOUT YOURSELF - OR THAT YOU ARE A FAILURE OR HAVE LET YOURSELF OR YOUR FAMILY DOWN: NOT AT ALL
SUM OF ALL RESPONSES TO PHQ QUESTIONS 1-9: 10